# Patient Record
Sex: FEMALE | Race: WHITE | NOT HISPANIC OR LATINO | Employment: OTHER | URBAN - METROPOLITAN AREA
[De-identification: names, ages, dates, MRNs, and addresses within clinical notes are randomized per-mention and may not be internally consistent; named-entity substitution may affect disease eponyms.]

---

## 2018-05-14 ENCOUNTER — RECORDS - HEALTHEAST (OUTPATIENT)
Dept: LAB | Facility: CLINIC | Age: 58
End: 2018-05-14

## 2018-05-14 LAB
ALBUMIN SERPL-MCNC: 3.6 G/DL (ref 3.5–5)
ALP SERPL-CCNC: 121 U/L (ref 45–120)
ALT SERPL W P-5'-P-CCNC: 43 U/L (ref 0–45)
ANION GAP SERPL CALCULATED.3IONS-SCNC: 10 MMOL/L (ref 5–18)
AST SERPL W P-5'-P-CCNC: 25 U/L (ref 0–40)
BILIRUB SERPL-MCNC: 0.6 MG/DL (ref 0–1)
BUN SERPL-MCNC: 12 MG/DL (ref 8–22)
CALCIUM SERPL-MCNC: 9.1 MG/DL (ref 8.5–10.5)
CHLORIDE BLD-SCNC: 104 MMOL/L (ref 98–107)
CHOLEST SERPL-MCNC: 255 MG/DL
CO2 SERPL-SCNC: 26 MMOL/L (ref 22–31)
CREAT SERPL-MCNC: 0.82 MG/DL (ref 0.6–1.1)
FASTING STATUS PATIENT QL REPORTED: NO
GFR SERPL CREATININE-BSD FRML MDRD: >60 ML/MIN/1.73M2
GLUCOSE BLD-MCNC: 97 MG/DL (ref 70–125)
HDLC SERPL-MCNC: 45 MG/DL
LDLC SERPL CALC-MCNC: 170 MG/DL
POTASSIUM BLD-SCNC: 3.9 MMOL/L (ref 3.5–5)
PROT SERPL-MCNC: 6.9 G/DL (ref 6–8)
SODIUM SERPL-SCNC: 140 MMOL/L (ref 136–145)
T4 FREE SERPL-MCNC: 1.2 NG/DL (ref 0.7–1.8)
TRIGL SERPL-MCNC: 200 MG/DL
TSH SERPL DL<=0.005 MIU/L-ACNC: 1.78 UIU/ML (ref 0.3–5)

## 2018-06-29 ENCOUNTER — RECORDS - HEALTHEAST (OUTPATIENT)
Dept: LAB | Facility: CLINIC | Age: 58
End: 2018-06-29

## 2018-06-29 LAB
ALBUMIN SERPL-MCNC: 2.9 G/DL (ref 3.5–5)
ALP SERPL-CCNC: 100 U/L (ref 45–120)
ALT SERPL W P-5'-P-CCNC: 33 U/L (ref 0–45)
ANION GAP SERPL CALCULATED.3IONS-SCNC: 14 MMOL/L (ref 5–18)
AST SERPL W P-5'-P-CCNC: 24 U/L (ref 0–40)
BILIRUB SERPL-MCNC: 0.5 MG/DL (ref 0–1)
BUN SERPL-MCNC: 6 MG/DL (ref 8–22)
C REACTIVE PROTEIN LHE: 12.4 MG/DL (ref 0–0.8)
CALCIUM SERPL-MCNC: 8.9 MG/DL (ref 8.5–10.5)
CHLORIDE BLD-SCNC: 98 MMOL/L (ref 98–107)
CO2 SERPL-SCNC: 31 MMOL/L (ref 22–31)
CREAT SERPL-MCNC: 0.78 MG/DL (ref 0.6–1.1)
GFR SERPL CREATININE-BSD FRML MDRD: >60 ML/MIN/1.73M2
GLUCOSE BLD-MCNC: 100 MG/DL (ref 70–125)
LIPASE SERPL-CCNC: 9 U/L (ref 0–52)
POTASSIUM BLD-SCNC: 2.8 MMOL/L (ref 3.5–5)
PROT SERPL-MCNC: 6 G/DL (ref 6–8)
SODIUM SERPL-SCNC: 143 MMOL/L (ref 136–145)

## 2018-07-10 ENCOUNTER — RECORDS - HEALTHEAST (OUTPATIENT)
Dept: LAB | Facility: CLINIC | Age: 58
End: 2018-07-10

## 2018-07-10 LAB
MAGNESIUM SERPL-MCNC: 2 MG/DL (ref 1.8–2.6)
POTASSIUM BLD-SCNC: 4.5 MMOL/L (ref 3.5–5)

## 2018-11-06 ENCOUNTER — RECORDS - HEALTHEAST (OUTPATIENT)
Dept: LAB | Facility: CLINIC | Age: 58
End: 2018-11-06

## 2018-11-07 LAB
T4 FREE SERPL-MCNC: 1.3 NG/DL (ref 0.7–1.8)
TSH SERPL DL<=0.005 MIU/L-ACNC: 3.14 UIU/ML (ref 0.3–5)

## 2019-08-02 ENCOUNTER — RECORDS - HEALTHEAST (OUTPATIENT)
Dept: LAB | Facility: CLINIC | Age: 59
End: 2019-08-02

## 2019-08-02 LAB
ALBUMIN SERPL-MCNC: 4 G/DL (ref 3.5–5)
ALP SERPL-CCNC: 117 U/L (ref 45–120)
ALT SERPL W P-5'-P-CCNC: 24 U/L (ref 0–45)
ANION GAP SERPL CALCULATED.3IONS-SCNC: 11 MMOL/L (ref 5–18)
AST SERPL W P-5'-P-CCNC: 18 U/L (ref 0–40)
BILIRUB SERPL-MCNC: 0.5 MG/DL (ref 0–1)
BUN SERPL-MCNC: 12 MG/DL (ref 8–22)
C REACTIVE PROTEIN LHE: 0.5 MG/DL (ref 0–0.8)
CALCIUM SERPL-MCNC: 10.1 MG/DL (ref 8.5–10.5)
CHLORIDE BLD-SCNC: 101 MMOL/L (ref 98–107)
CO2 SERPL-SCNC: 28 MMOL/L (ref 22–31)
CREAT SERPL-MCNC: 0.85 MG/DL (ref 0.6–1.1)
ERYTHROCYTE [SEDIMENTATION RATE] IN BLOOD BY WESTERGREN METHOD: 28 MM/HR (ref 0–20)
GFR SERPL CREATININE-BSD FRML MDRD: >60 ML/MIN/1.73M2
GLUCOSE BLD-MCNC: 92 MG/DL (ref 70–125)
POTASSIUM BLD-SCNC: 4.5 MMOL/L (ref 3.5–5)
PROT SERPL-MCNC: 7.2 G/DL (ref 6–8)
SODIUM SERPL-SCNC: 140 MMOL/L (ref 136–145)
T4 FREE SERPL-MCNC: 1.2 NG/DL (ref 0.7–1.8)
TSH SERPL DL<=0.005 MIU/L-ACNC: 1.15 UIU/ML (ref 0.3–5)

## 2019-11-12 ENCOUNTER — TRANSFERRED RECORDS (OUTPATIENT)
Dept: HEALTH INFORMATION MANAGEMENT | Facility: CLINIC | Age: 59
End: 2019-11-12

## 2019-11-27 NOTE — TELEPHONE ENCOUNTER
ONCOLOGY INTAKE: Records Information      APPT INFORMATION: [id number] Dr. Tramaine Garland  Referring provider:  Dr. Luna Clark  Referring provider s clinic:  [facility]  Reason for visit/diagnosis:  Melanoma  Has patient been notified of appointment date and time?: Yes    RECORDS INFORMATION:  Were the records received with the referral (via [employer])? No    Has patient been seen for any external appt for this diagnosis? Yes    If yes, where? [facility]    Has patient had any imaging or procedures outside of [facility] for this condition? Yes      If Yes, where? [facility]    ADDITIONAL INFORMATION:  Patient said referral was faxed, but intake did not receive any information.

## 2019-11-30 NOTE — TELEPHONE ENCOUNTER
RECORDS STATUS - ALL OTHER DIAGNOSIS      RECORDS RECEIVED FROM: [facility]   DATE RECEIVED: REQUESTED    NOTES STATUS DETAILS   OFFICE NOTE from referring provider     OFFICE NOTE from medical oncologist     DISCHARGE SUMMARY from hospital     DISCHARGE REPORT from the ER     OPERATIVE REPORT     MEDICATION LIST     CLINICAL TRIAL TREATMENTS TO DATE     LABS     PATHOLOGY REPORTS Received 12/3/19 (PATRICK) [facility]   ANYTHING RELATED TO DIAGNOSIS Slides received 12/5/19 [facility]   GENONOMIC TESTING     TYPE:     IMAGING (NEED IMAGES & REPORT)     CT SCANS     MRI     MAMMO     ULTRASOUND     PET       Action    Action Taken REQUEST SENT TO [facility]

## 2019-12-10 PROBLEM — C43.59: Status: ACTIVE | Noted: 2019-12-10

## 2019-12-12 PROCEDURE — 00000346 ZZHCL STATISTIC REVIEW OUTSIDE SLIDES TC 88321: Performed by: SURGERY

## 2019-12-18 LAB — COPATH REPORT: NORMAL

## 2019-12-20 ENCOUNTER — PRE VISIT (OUTPATIENT)
Dept: ONCOLOGY | Facility: CLINIC | Age: 59
End: 2019-12-20

## 2019-12-20 ENCOUNTER — ONCOLOGY VISIT (OUTPATIENT)
Dept: ONCOLOGY | Facility: CLINIC | Age: 59
End: 2019-12-20
Attending: SURGERY
Payer: COMMERCIAL

## 2019-12-20 VITALS
BODY MASS INDEX: 37.62 KG/M2 | SYSTOLIC BLOOD PRESSURE: 159 MMHG | TEMPERATURE: 97.8 F | HEIGHT: 69 IN | HEART RATE: 75 BPM | WEIGHT: 254 LBS | OXYGEN SATURATION: 93 % | RESPIRATION RATE: 16 BRPM | DIASTOLIC BLOOD PRESSURE: 86 MMHG

## 2019-12-20 DIAGNOSIS — C43.59: ICD-10-CM

## 2019-12-20 PROCEDURE — G0463 HOSPITAL OUTPT CLINIC VISIT: HCPCS | Mod: ZF

## 2019-12-20 RX ORDER — BUDESONIDE AND FORMOTEROL FUMARATE DIHYDRATE 160; 4.5 UG/1; UG/1
2 AEROSOL RESPIRATORY (INHALATION) 2 TIMES DAILY
COMMUNITY

## 2019-12-20 RX ORDER — OMEPRAZOLE 40 MG/1
40 CAPSULE, DELAYED RELEASE ORAL DAILY
COMMUNITY

## 2019-12-20 RX ORDER — CEFAZOLIN SODIUM 1 G/50ML
1 INJECTION, SOLUTION INTRAVENOUS SEE ADMIN INSTRUCTIONS
Status: CANCELLED | OUTPATIENT
Start: 2019-12-20

## 2019-12-20 RX ORDER — MELOXICAM 15 MG/1
15 TABLET ORAL DAILY
COMMUNITY

## 2019-12-20 RX ORDER — CEFAZOLIN SODIUM 2 G/50ML
2 SOLUTION INTRAVENOUS
Status: CANCELLED | OUTPATIENT
Start: 2019-12-20

## 2019-12-20 RX ORDER — LEVOTHYROXINE SODIUM 100 UG/1
100 TABLET ORAL
COMMUNITY
End: 2020-01-13

## 2019-12-20 RX ORDER — HYDROCHLOROTHIAZIDE 25 MG/1
25 TABLET ORAL PRN
COMMUNITY
End: 2022-07-12

## 2019-12-20 RX ORDER — FLUOXETINE 10 MG/1
10 CAPSULE ORAL DAILY
COMMUNITY
End: 2020-01-13

## 2019-12-20 ASSESSMENT — PAIN SCALES - GENERAL: PAINLEVEL: NO PAIN (0)

## 2019-12-20 ASSESSMENT — MIFFLIN-ST. JEOR: SCORE: 1781.52

## 2019-12-20 NOTE — NURSING NOTE
"Oncology Rooming Note    December 20, 2019 9:37 AM   Dayana Valverde is a 59 year old female who presents for:    Chief Complaint   Patient presents with     Oncology Clinic Visit     P NEW- MELANOMA     Initial Vitals: BP (!) 159/86 (BP Location: Right arm, Patient Position: Chair, Cuff Size: Adult Large)   Pulse 75   Temp 97.8  F (36.6  C)   Resp 16   Ht 1.753 m (5' 9\")   Wt 115.2 kg (254 lb)   SpO2 93%   BMI 37.51 kg/m   Estimated body mass index is 37.51 kg/m  as calculated from the following:    Height as of this encounter: 1.753 m (5' 9\").    Weight as of this encounter: 115.2 kg (254 lb). Body surface area is 2.37 meters squared.  No Pain (0) Comment: Data Unavailable   No LMP recorded.  Allergies reviewed: Yes  Medications reviewed: Yes    Medications: Medication refills not needed today.  Pharmacy name entered into EPIC: [facility] - [place], [place] - [address]    Clinical concerns: No new concerns. Gill was notified.      John Chacon LPN            "

## 2019-12-20 NOTE — PROGRESS NOTES
HISTORY OF PRESENT ILLNESS:  Dayana Valverde is a 59-year-old woman I was asked to see at the request of Dr. Clark for evaluation of a new melanoma of her right scapula.  The patient stated she noted a pigmented skin lesion there for about a year.  It did not go away, so she sought medical attention.  She saw Dr. Clark who performed a biopsy which demonstrated a melanoma, 1.1 mm in thickness.  Ulceration was absent.  There were 3 mitotic figures per mm2 of tissue.  Deep margin was negative for melanoma.  She is now here to talk about treatment options.  She denies any symptoms of lymphadenopathy or metastatic disease.  She has had no prior history of skin cancer.         PAST MEDICAL HISTORY:  Significant for COPD.      FAMILY HISTORY:  Negative for melanoma.      PHYSICAL EXAMINATION:  She is a well-appearing woman in no apparent distress.  She has a healed biopsy site in her right scapula with no residual pigmentation.  Head and neck examination reveals no cervical, supraclavicular or infraclavicular lymphadenopathy axillary.  Axillary examination revealed no lymphadenopathy.      IMPRESSION:  Intermediate-thickness melanoma.      PLAN:  I recommend a wide local excision with a 1 cm margin of resection.  I recommend a sentinel lymph node biopsy.  I told her if the lymph node biopsy is positive then we would consider immune therapy.  We will schedule her surgery at her convenience.      cc:     Luna Clark MD   [facility]   [address] [place]  [address]

## 2019-12-23 ENCOUNTER — MYC MEDICAL ADVICE (OUTPATIENT)
Dept: PLASTIC SURGERY | Facility: CLINIC | Age: 59
End: 2019-12-23

## 2019-12-27 ENCOUNTER — TRANSFERRED RECORDS (OUTPATIENT)
Dept: HEALTH INFORMATION MANAGEMENT | Facility: CLINIC | Age: 59
End: 2019-12-27

## 2019-12-27 ENCOUNTER — RECORDS - HEALTHEAST (OUTPATIENT)
Dept: LAB | Facility: CLINIC | Age: 59
End: 2019-12-27

## 2019-12-27 LAB
ANION GAP SERPL CALCULATED.3IONS-SCNC: 11 MMOL/L (ref 5–18)
BUN SERPL-MCNC: 13 MG/DL (ref 8–22)
CALCIUM SERPL-MCNC: 9.1 MG/DL (ref 8.5–10.5)
CHLORIDE BLD-SCNC: 103 MMOL/L (ref 98–107)
CO2 SERPL-SCNC: 27 MMOL/L (ref 22–31)
CREAT SERPL-MCNC: 0.83 MG/DL (ref 0.6–1.1)
GFR SERPL CREATININE-BSD FRML MDRD: >60 ML/MIN/1.73M2
GLUCOSE BLD-MCNC: 106 MG/DL (ref 70–125)
POTASSIUM BLD-SCNC: 4.7 MMOL/L (ref 3.5–5)
SODIUM SERPL-SCNC: 141 MMOL/L (ref 136–145)

## 2019-12-30 ENCOUNTER — ANESTHESIA EVENT (OUTPATIENT)
Dept: SURGERY | Facility: AMBULATORY SURGERY CENTER | Age: 59
End: 2019-12-30

## 2019-12-30 LAB — 25(OH)D3 SERPL-MCNC: 48.4 NG/ML (ref 30–80)

## 2019-12-31 ENCOUNTER — ANESTHESIA (OUTPATIENT)
Dept: SURGERY | Facility: AMBULATORY SURGERY CENTER | Age: 59
End: 2019-12-31

## 2019-12-31 ENCOUNTER — HOSPITAL ENCOUNTER (OUTPATIENT)
Dept: NUCLEAR MEDICINE | Facility: CLINIC | Age: 59
Setting detail: NUCLEAR MEDICINE
Discharge: HOME | End: 2019-12-31
Attending: SURGERY | Admitting: SURGERY
Payer: COMMERCIAL

## 2019-12-31 ENCOUNTER — HOSPITAL ENCOUNTER (OUTPATIENT)
Facility: AMBULATORY SURGERY CENTER | Age: 59
End: 2019-12-31
Attending: SURGERY
Payer: COMMERCIAL

## 2019-12-31 VITALS
SYSTOLIC BLOOD PRESSURE: 125 MMHG | TEMPERATURE: 97.3 F | WEIGHT: 254 LBS | DIASTOLIC BLOOD PRESSURE: 44 MMHG | HEIGHT: 69 IN | BODY MASS INDEX: 37.62 KG/M2 | OXYGEN SATURATION: 92 % | RESPIRATION RATE: 16 BRPM | HEART RATE: 74 BPM

## 2019-12-31 DIAGNOSIS — C43.59: ICD-10-CM

## 2019-12-31 PROCEDURE — 34300033 ZZH RX 343: Performed by: SURGERY

## 2019-12-31 PROCEDURE — 78195 LYMPH SYSTEM IMAGING: CPT

## 2019-12-31 PROCEDURE — A9520 TC99 TILMANOCEPT DIAG 0.5MCI: HCPCS | Performed by: SURGERY

## 2019-12-31 RX ORDER — MEPERIDINE HYDROCHLORIDE 25 MG/ML
12.5 INJECTION INTRAMUSCULAR; INTRAVENOUS; SUBCUTANEOUS
Status: DISCONTINUED | OUTPATIENT
Start: 2019-12-31 | End: 2020-01-01 | Stop reason: HOSPADM

## 2019-12-31 RX ORDER — OXYCODONE HYDROCHLORIDE 5 MG/1
5 TABLET ORAL EVERY 4 HOURS PRN
Status: DISCONTINUED | OUTPATIENT
Start: 2019-12-31 | End: 2020-01-01 | Stop reason: HOSPADM

## 2019-12-31 RX ORDER — ACETAMINOPHEN 325 MG/1
975 TABLET ORAL ONCE
Status: COMPLETED | OUTPATIENT
Start: 2019-12-31 | End: 2019-12-31

## 2019-12-31 RX ORDER — ACETAMINOPHEN 325 MG/1
650 TABLET ORAL EVERY 4 HOURS PRN
Qty: 50 TABLET | Refills: 0 | Status: SHIPPED | OUTPATIENT
Start: 2019-12-31 | End: 2022-11-04

## 2019-12-31 RX ORDER — FENTANYL CITRATE 50 UG/ML
INJECTION, SOLUTION INTRAMUSCULAR; INTRAVENOUS PRN
Status: DISCONTINUED | OUTPATIENT
Start: 2019-12-31 | End: 2019-12-31

## 2019-12-31 RX ORDER — PROPOFOL 10 MG/ML
INJECTION, EMULSION INTRAVENOUS CONTINUOUS PRN
Status: DISCONTINUED | OUTPATIENT
Start: 2019-12-31 | End: 2019-12-31

## 2019-12-31 RX ORDER — SODIUM CHLORIDE, SODIUM LACTATE, POTASSIUM CHLORIDE, CALCIUM CHLORIDE 600; 310; 30; 20 MG/100ML; MG/100ML; MG/100ML; MG/100ML
INJECTION, SOLUTION INTRAVENOUS CONTINUOUS
Status: DISCONTINUED | OUTPATIENT
Start: 2019-12-31 | End: 2019-12-31 | Stop reason: HOSPADM

## 2019-12-31 RX ORDER — FENTANYL CITRATE 50 UG/ML
25-50 INJECTION, SOLUTION INTRAMUSCULAR; INTRAVENOUS
Status: DISCONTINUED | OUTPATIENT
Start: 2019-12-31 | End: 2019-12-31 | Stop reason: HOSPADM

## 2019-12-31 RX ORDER — NALOXONE HYDROCHLORIDE 0.4 MG/ML
.1-.4 INJECTION, SOLUTION INTRAMUSCULAR; INTRAVENOUS; SUBCUTANEOUS
Status: DISCONTINUED | OUTPATIENT
Start: 2019-12-31 | End: 2020-01-01 | Stop reason: HOSPADM

## 2019-12-31 RX ORDER — OXYCODONE HYDROCHLORIDE 5 MG/1
5-10 TABLET ORAL EVERY 4 HOURS PRN
Qty: 16 TABLET | Refills: 0 | Status: SHIPPED | OUTPATIENT
Start: 2019-12-31 | End: 2022-11-04

## 2019-12-31 RX ORDER — CEFAZOLIN SODIUM 1 G/50ML
1 SOLUTION INTRAVENOUS SEE ADMIN INSTRUCTIONS
Status: DISCONTINUED | OUTPATIENT
Start: 2019-12-31 | End: 2019-12-31 | Stop reason: HOSPADM

## 2019-12-31 RX ORDER — LIDOCAINE 40 MG/G
CREAM TOPICAL
Status: DISCONTINUED | OUTPATIENT
Start: 2019-12-31 | End: 2019-12-31 | Stop reason: HOSPADM

## 2019-12-31 RX ORDER — AMOXICILLIN 250 MG
1-2 CAPSULE ORAL 2 TIMES DAILY
Qty: 15 TABLET | Refills: 0 | Status: SHIPPED | OUTPATIENT
Start: 2019-12-31 | End: 2022-11-04

## 2019-12-31 RX ORDER — PROPOFOL 10 MG/ML
INJECTION, EMULSION INTRAVENOUS PRN
Status: DISCONTINUED | OUTPATIENT
Start: 2019-12-31 | End: 2019-12-31

## 2019-12-31 RX ORDER — SODIUM CHLORIDE, SODIUM LACTATE, POTASSIUM CHLORIDE, CALCIUM CHLORIDE 600; 310; 30; 20 MG/100ML; MG/100ML; MG/100ML; MG/100ML
INJECTION, SOLUTION INTRAVENOUS CONTINUOUS
Status: DISCONTINUED | OUTPATIENT
Start: 2019-12-31 | End: 2020-01-01 | Stop reason: HOSPADM

## 2019-12-31 RX ORDER — ONDANSETRON 2 MG/ML
4 INJECTION INTRAMUSCULAR; INTRAVENOUS EVERY 30 MIN PRN
Status: DISCONTINUED | OUTPATIENT
Start: 2019-12-31 | End: 2020-01-01 | Stop reason: HOSPADM

## 2019-12-31 RX ORDER — CEFAZOLIN SODIUM 2 G/50ML
2 SOLUTION INTRAVENOUS
Status: COMPLETED | OUTPATIENT
Start: 2019-12-31 | End: 2019-12-31

## 2019-12-31 RX ORDER — OXYCODONE HYDROCHLORIDE 5 MG/1
5 TABLET ORAL
Status: DISCONTINUED | OUTPATIENT
Start: 2019-12-31 | End: 2020-01-01 | Stop reason: HOSPADM

## 2019-12-31 RX ORDER — ONDANSETRON 2 MG/ML
INJECTION INTRAMUSCULAR; INTRAVENOUS PRN
Status: DISCONTINUED | OUTPATIENT
Start: 2019-12-31 | End: 2019-12-31

## 2019-12-31 RX ORDER — DEXAMETHASONE SODIUM PHOSPHATE 4 MG/ML
INJECTION, SOLUTION INTRA-ARTICULAR; INTRALESIONAL; INTRAMUSCULAR; INTRAVENOUS; SOFT TISSUE PRN
Status: DISCONTINUED | OUTPATIENT
Start: 2019-12-31 | End: 2019-12-31

## 2019-12-31 RX ORDER — LIDOCAINE HYDROCHLORIDE 20 MG/ML
INJECTION, SOLUTION INFILTRATION; PERINEURAL PRN
Status: DISCONTINUED | OUTPATIENT
Start: 2019-12-31 | End: 2019-12-31

## 2019-12-31 RX ORDER — GABAPENTIN 300 MG/1
300 CAPSULE ORAL ONCE
Status: COMPLETED | OUTPATIENT
Start: 2019-12-31 | End: 2019-12-31

## 2019-12-31 RX ORDER — ACETAMINOPHEN 325 MG/1
650 TABLET ORAL
Status: DISCONTINUED | OUTPATIENT
Start: 2019-12-31 | End: 2020-01-01 | Stop reason: HOSPADM

## 2019-12-31 RX ORDER — ONDANSETRON 4 MG/1
4 TABLET, ORALLY DISINTEGRATING ORAL EVERY 30 MIN PRN
Status: DISCONTINUED | OUTPATIENT
Start: 2019-12-31 | End: 2020-01-01 | Stop reason: HOSPADM

## 2019-12-31 RX ADMIN — LIDOCAINE HYDROCHLORIDE 100 MG: 20 INJECTION, SOLUTION INFILTRATION; PERINEURAL at 12:02

## 2019-12-31 RX ADMIN — FENTANYL CITRATE 50 MCG: 50 INJECTION, SOLUTION INTRAMUSCULAR; INTRAVENOUS at 12:02

## 2019-12-31 RX ADMIN — CEFAZOLIN SODIUM 2 G: 2 SOLUTION INTRAVENOUS at 12:07

## 2019-12-31 RX ADMIN — SODIUM CHLORIDE, SODIUM LACTATE, POTASSIUM CHLORIDE, CALCIUM CHLORIDE: 600; 310; 30; 20 INJECTION, SOLUTION INTRAVENOUS at 11:55

## 2019-12-31 RX ADMIN — FENTANYL CITRATE 50 MCG: 50 INJECTION, SOLUTION INTRAMUSCULAR; INTRAVENOUS at 12:41

## 2019-12-31 RX ADMIN — TILMANOCEPT 0.5 MCI.: KIT at 07:44

## 2019-12-31 RX ADMIN — PROPOFOL 200 MG: 10 INJECTION, EMULSION INTRAVENOUS at 12:02

## 2019-12-31 RX ADMIN — DEXAMETHASONE SODIUM PHOSPHATE 4 MG: 4 INJECTION, SOLUTION INTRA-ARTICULAR; INTRALESIONAL; INTRAMUSCULAR; INTRAVENOUS; SOFT TISSUE at 12:22

## 2019-12-31 RX ADMIN — ONDANSETRON 4 MG: 2 INJECTION INTRAMUSCULAR; INTRAVENOUS at 12:39

## 2019-12-31 RX ADMIN — PROPOFOL 150 MCG/KG/MIN: 10 INJECTION, EMULSION INTRAVENOUS at 12:06

## 2019-12-31 RX ADMIN — GABAPENTIN 300 MG: 300 CAPSULE ORAL at 09:56

## 2019-12-31 RX ADMIN — ACETAMINOPHEN 975 MG: 325 TABLET ORAL at 09:56

## 2019-12-31 ASSESSMENT — MIFFLIN-ST. JEOR: SCORE: 1781.52

## 2019-12-31 ASSESSMENT — COPD QUESTIONNAIRES: COPD: 1

## 2019-12-31 NOTE — ANESTHESIA PREPROCEDURE EVALUATION
Anesthesia Pre-Procedure Evaluation    Patient: Dayana Valverde   MRN:     [id number] Gender:   female   Age:    59 year old :      1960        Preoperative Diagnosis: Melanoma of scapular region (H) [C43.59]   Procedure(s):  Wide local excision of right back melanoma  [place] lymph node biopsy     Past Medical History:   Diagnosis Date     Complication of anesthesia      PONV (postoperative nausea and vomiting)       History reviewed. No pertinent surgical history.       Anesthesia Evaluation     . Pt has had prior anesthetic.     History of anesthetic complications   - PONV        ROS/MED HX    ENT/Pulmonary:     (+)IVETTE risk factors obese, COPD, , . .    Neurologic:  - neg neurologic ROS     Cardiovascular:         METS/Exercise Tolerance:  >4 METS   Hematologic:         Musculoskeletal:         GI/Hepatic:     (+) GERD Asymptomatic on medication,       Renal/Genitourinary:  - ROS Renal section negative       Endo:     (+) Obesity, .      Psychiatric:  - neg psychiatric ROS       Infectious Disease:  - neg infectious disease ROS       Malignancy:         Other:                         PHYSICAL EXAM:   Mental Status/Neuro: A/A/O   Airway: Facies: Feasible  Mallampati: II  Mouth/Opening: Full  TM distance: > 6 cm  Neck ROM: Full   Respiratory: Auscultation: CTAB     Resp. Rate: Normal     Resp. Effort: Normal      CV: Rhythm: Regular  Rate: Age appropriate  Heart: Normal Sounds  Edema: None   Comments:      Dental: Normal Dentition                LABS:  CBC: No results found for: WBC, HGB, HCT, PLT  BMP: No results found for: NA, POTASSIUM, CHLORIDE, CO2, BUN, CR, GLC  COAGS: No results found for: PTT, INR, FIBR  POC: No results found for: BGM, HCG, HCGS  OTHER: No results found for: PH, LACT, A1C, SHANNON, PHOS, MAG, ALBUMIN, PROTTOTAL, ALT, AST, GGT, ALKPHOS, BILITOTAL, BILIDIRECT, LIPASE, AMYLASE, NORMA, TSH, T4, T3, CRP, SED     Preop Vitals    BP Readings from Last 3 Encounters:   19 (!) 144/85  "  12/20/19 (!) 159/86    Pulse Readings from Last 3 Encounters:   12/31/19 75   12/20/19 75      Resp Readings from Last 3 Encounters:   12/31/19 16   12/20/19 16    SpO2 Readings from Last 3 Encounters:   12/31/19 97%   12/20/19 93%      Temp Readings from Last 1 Encounters:   12/31/19 36.6  C (97.8  F) (Oral)    Ht Readings from Last 1 Encounters:   12/31/19 1.753 m (5' 9\")      Wt Readings from Last 1 Encounters:   12/31/19 115.2 kg (254 lb)    Estimated body mass index is 37.51 kg/m  as calculated from the following:    Height as of this encounter: 1.753 m (5' 9\").    Weight as of this encounter: 115.2 kg (254 lb).     LDA:        Assessment:   ASA SCORE: 3      Smoking Status:  Non-Smoker/Unknown   NPO Status: NPO Appropriate     Plan:   Anes. Type:  General   Pre-Medication: None   Induction:  IV (Standard)   Airway: ETT; Oral   Access/Monitoring: PIV   Maintenance: TIVA     Postop Plan:   Postop Pain: Opioids  Postop Sedation/Airway: Not planned  Disposition: Outpatient     PONV Management:   Adult Risk Factors: Female, H/o PONV or Motion Sickness, Non-Smoker, Postop Opioids   Prevention: Ondansetron, Dexamethasone, No Volatiles     CONSENT: Direct conversation   Plan and risks discussed with: Patient   Blood Products: Consent Deferred (Minimal Blood Loss)                   Adolfo Kraft MD  "

## 2019-12-31 NOTE — OP NOTE
Procedure Date: 12/31/2019      PREOPERATIVE DIAGNOSIS:  Melanoma, right back.      POSTOPERATIVE DIAGNOSIS:  Melanoma, right back.      PROCEDURE:  Lymphatic mapping, right axillary sentinel lymph node biopsy x1, wide local excision of right back melanoma.      ATTENDING SURGEON:  Tramaine Garland MD      ANESTHESIA:  General with LMA.      INDICATIONS FOR SURGERY:  The patient is a 59-year-old woman who presents with an intermediate thickness melanoma of her right back near the scapula.  She had preoperative lymphoscintigraphy, which revealed uptake to the right axilla.  She now presents for surgical treatment.      PROCEDURE IN DETAIL:  After informed consent, the patient was brought to the operating room, given a general anesthetic and an LMA was placed.  She was situated in the lateral decubitus position with the right arm up.  I injected isosulfan blue intradermally around her biopsy scar.  She was then prepped and draped in the usual fashion.  I drew out a 1 cm margin of resection around the biopsy scar and created an elliptical skin incision.  Before doing the skin incision, I injected local anesthetic.  Elliptical skin incision was made with a scalpel.  The Bovie cautery was used to incise the subcutaneous tissues.  The dissection went through the subcutaneous fat down to the fascia.  Specimen was removed, oriented and sent to pathology.  Strict hemostasis was obtained with the Bovie cautery.  The dermis was closed in layers with interrupted 3-0 Vicryl suture for the dermal layer and a running 4-0 PDS subcuticular stitch for the skin.  I also placed horizontal mattress sutures into the dermis to serve as retention sutures.  Dermabond was placed.  We then identified a transcutaneous hot spot in the right axilla.  Local anesthetic was administered.  A transverse axillary incision was made with a scalpel.  The Bovie cautery was used to incise the subcutaneous tissues.  Dissection proceeded through the  clavipectoral fascia.  I identified a blue-stained radioactive lymph node.  Check lymph node #1 was removed and had ex vivo counts of 670 counts per second.  There were no additional blue, radioactive or palpable lymph nodes.  This incision was also closed with interrupted 3-0 Vicryl sutures for the dermal layer and a running 4-0 PDS subcuticular stitch for the skin.  Dermabond was placed and the patient was taken to the recovery room in stable condition.         JOHN LEMA MD             D: 2019   T: 2019   MT: saran      Name:     EZEKIEL ALMAGUER   MRN:      [id number]        Account:        [id number]   :      1960           Procedure Date: 2019      Document: [id number]

## 2019-12-31 NOTE — ANESTHESIA CARE TRANSFER NOTE
Patient: Dayana Valverde    Procedure(s):  Wide local excision of right back melanoma  [place] lymph node biopsy    Diagnosis: Melanoma of scapular region (H) [C43.59]  Diagnosis Additional Information: No value filed.    Anesthesia Type:   General     Note:  Airway :Room Air  Patient transferred to:PACU  Handoff Report: Identifed the Patient, Identified the Reponsible Provider, Reviewed the pertinent medical history, Discussed the surgical course, Reviewed Intra-OP anesthesia mangement and issues during anesthesia, Set expectations for post-procedure period and Allowed opportunity for questions and acknowledgement of understanding      Vitals: (Last set prior to Anesthesia Care Transfer)    CRNA VITALS  12/31/2019 1243 - 12/31/2019 1321      12/31/2019             Resp Rate (set):  10                Electronically Signed By: AMBER Young CRNA  December 31, 2019  1:21 PM

## 2019-12-31 NOTE — ANESTHESIA POSTPROCEDURE EVALUATION
Anesthesia POST Procedure Evaluation    Patient: Dayana Valverde   MRN:     [id number] Gender:   female   Age:    59 year old :      1960        Preoperative Diagnosis: Melanoma of scapular region (H) [C43.59]   Procedure(s):  Wide local excision of right back melanoma  [place] lymph node biopsy   Postop Comments: No value filed.       Anesthesia Type:  Not documented  General    Reportable Event: NO     PAIN: Uncomplicated   Sign Out status: Comfortable, Well controlled pain     PONV: No PONV   Sign Out status:  No Nausea or Vomiting     Neuro/Psych: Uneventful perioperative course   Sign Out Status: Preoperative baseline; Age appropriate mentation     Airway/Resp.: Uneventful perioperative course   Sign Out Status: Non labored breathing, age appropriate RR; Resp. Status within EXPECTED Parameters     CV: Uneventful perioperative course   Sign Out status: Appropriate BP and perfusion indices; Appropriate HR/Rhythm     Disposition:   Sign Out in:  Phase II  Disposition:  Home  Recovery Course: Uneventful  Follow-Up: Not required           Last Anesthesia Record Vitals:  CRNA VITALS  2019 1243 - 2019 1343      2019             EKG:  NSR          Last PACU Vitals:  Vitals Value Taken Time   BP     Temp 36.2  C (97.2  F) 2019  1:25 PM   Pulse 69 2019  1:24 PM   Resp 13 2019  1:26 PM   SpO2 92 % 2019  1:26 PM   Temp src Nasopharynx 2019  1:25 PM   NIBP     Pulse     SpO2     Resp     Temp     Ht Rate     Temp 2     Vitals shown include unvalidated device data.      Electronically Signed By: Juan Neal MD, 2019, 2:07 PM

## 2019-12-31 NOTE — DISCHARGE INSTRUCTIONS
[facility]  Home Care Following Anesthesia  For 24 hours after surgery:  1. Get plenty of rest.  A responsible adult must stay with you for at least 24 hours after you leave the surgery center.  2. Do not drive or use heavy equipment.  If you have weakness or tingling, don't drive or use heavy equipment until this feeling goes away.   3. Do not drink alcohol.   4. Avoid strenuous or risky activities.  Ask for help when climbing stairs.  5. You may feel lightheaded.  IF so, sit for a few minutes before standing.  Have someone help you get up.   6. If you have nausea (feel sick to your stomach): Drink only clear liquids such as apple juice, ginger ale, broth or 7-Up.  Rest may also help.  Be sure to drink enough fluids.  Move to a regular diet as you feel able.   7. You may have a slight fever.  Call the doctor if your fever is over 100 F (37.7 C) (taken under the tongue) or lasts longer than 24 hours.  8. You may have a dry mouth, a sore throat, muscle aches or trouble sleeping. These should go away after 24 hours.  9. Do not make important or legal decisions.        Tips for taking pain medications  To get the best pain relief possible, remember these points:    Take pain medications as directed, before pain becomes severe.    Pain medication can upset your stomach: taking it with food may help.    Constipation is a common side effect of pain medication. Drink plenty of  fluids.    Eat foods high in fiber. Take a stool softener if recommended by your doctor or pharmacist.    Do not drink alcohol, drive or operate machinery while taking pain medications.    Ask about other ways to control pain, such as with heat, ice or relaxation.    Tylenol/Acetaminophen Consumption  To help encourage the safe use of acetaminophen, the makers of TYLENOL  have lowered the maximum daily dose for single-ingredient Extra Strength TYLENOL  (acetaminophen) products sold in the [place] from 8 pills per  day (4,000 mg) to 6 pills per day (3,000 mg). The dosing interval has also changed from 2 pills every 4-6 hours to 2 pills every 6 hours.    If you feel your pain relief is insufficient, you may take Tylenol/Acetaminophen in addition to your narcotic pain medication.     Be careful not to exceed 3,000 mg of Tylenol/Acetaminophen in a 24 hour period from all sources.    If you are taking extra strength Tylenol/acetaminophen (500 mg), the maximum dose is 6 tablets in 24 hours.    If you are taking regular strength acetaminophen (325 mg), the maximum dose is 9 tablets in 24 hours.    **975 mg Tylenol taken at 10, can take again at 4 PM    Call a doctor for any of the followin. Signs of infection (fever, growing tenderness at the surgery site, a large amount of drainage or bleeding, severe pain, foul-smelling drainage, redness, swelling).  2. It has been over 8 to 10 hours since surgery and you are still not able to urinate (pass water).  3. Headache for over 24 hours.  Your doctor is:       Dr. Tramaine Garland, [facility]: [phone]               Or dial [phone] and ask for the resident on call for:  [facility]  For emergency care, call the:  [place] [facility]:  [phone] (TTY for hearing impaired: [phone])

## 2020-01-01 ENCOUNTER — TELEPHONE (OUTPATIENT)
Dept: SURGERY | Facility: CLINIC | Age: 60
End: 2020-01-01

## 2020-01-01 ENCOUNTER — NURSE TRIAGE (OUTPATIENT)
Dept: NURSING | Facility: CLINIC | Age: 60
End: 2020-01-01

## 2020-01-01 NOTE — TELEPHONE ENCOUNTER
Patient calling for wound care instructions. Home care instructions do not tell her how to care for her wound. She wants to shower, it has been over 24 hours. Reviewed guideline home care on wound care.    Additional Information    Negative: [1] Major abdominal surgical incision AND [2] wound gaping open AND [3] visible internal organs    Negative: Sounds like a life-threatening emergency to the triager    Negative: [1] Bleeding from incision AND [2] won't stop after 10 minutes of direct pressure    Negative: [1] Widespread rash AND [2] bright red, sunburn-like    Negative: Severe pain in the incision    Negative: [1] Suture came out early AND [2] wound gaping AND [3] < 48 hours since sutures placed    Negative: [1] Incision gaping open AND [2] length of opening > 2 inches (5 cm)    Negative: Patient sounds very sick or weak to the triager    Negative: Sounds like a serious complication to the triager    Negative: Fever > 100.5 F (38.1 C)    Negative: [1] Incision looks infected (spreading redness, pain) AND [2] fever > 99.5 F (37.5 C)    Negative: [1] Incision looks infected (spreading redness, pain) AND [2] large red area (> 2 in. or 5 cm)    Negative: [1] Incision looks infected (spreading redness, pain) AND [2] face wound    Negative: [1] Red streak runs from the incision AND [2] longer than 1 inch (2.5 cm)    Negative: [1] Pus or bad-smelling fluid draining from incision AND [2] no fever    Negative: [1] Post-op pain AND [2] not controlled with pain medications    Negative: Dressing soaked with blood or body fluid (e.g., drainage)    Negative: [1] Caller has URGENT question AND [2] triager unable to answer question    Negative: [1] Small red area or streak AND [2] no fever    Negative: [1] Clear or blood-tinged fluid draining from wound AND [2] no fever    Negative: [1] 48 hours since surgery AND [2] wound is becoming more tender    Negative: [1] Incision gaping open AND [2] on the face AND [3] > 48 hours since  sutures placed    Negative: [1] Incision gaping open AND [3] length of opening > 1/2 inch (1 cm) AND [3] > 48 hours since sutures placed    Negative: Suture or staple removal is overdue    Negative: [1] Suture or staple came out early AND [2] caller wants wound checked    Negative: [1] Caller has NON-URGENT question AND [2] triager unable to answer question    Negative: Pimple where a stitch comes through the skin    Sutured or stapled surgical incision, questions about    Negative: Skin tape (e.g., Steri-strips) removal, questions about    Negative: Suture removal date, questions about    Negative: Suture or staple came out early    Protocols used: POST-OP INCISION SYMPTOMS-A-AH

## 2020-01-03 ENCOUNTER — TELEPHONE (OUTPATIENT)
Dept: ONCOLOGY | Facility: CLINIC | Age: 60
End: 2020-01-03

## 2020-01-03 NOTE — TELEPHONE ENCOUNTER
POST-OP CALL  Andres 3, 2020    Dayana Valverde is a 59 year old female s/p   Lymphatic mapping, right axillary sentinel lymph node biopsy x1, wide local excision of right back melanoma.     She reports doing well but tired.   Fevers/chills: Patient denies fever/chills.  Eating/drinking: Patient is able to eat and drink without any complaints.   Bowel habits: Patient reports having a normal bowel movement.  Urine output: Voiding without difficulty.   Incisions: Patient denies any signs and symptoms of infection. No erythema, swelling or drainage.   Pain: Patient reports pain is controlled with tylenol. She has some burning in her axilla, discussed this was to be expected.   Follow up appointment scheduled on 1/13 with Dr. Garland.  Patient will call with any questions or concerns.    Jessica Moore PA-C

## 2020-01-06 LAB — COPATH REPORT: NORMAL

## 2020-01-07 ASSESSMENT — ENCOUNTER SYMPTOMS
SHORTNESS OF BREATH: 1
MYALGIAS: 1
POSTURAL DYSPNEA: 0
HEMOPTYSIS: 0
JOINT SWELLING: 1
DYSPNEA ON EXERTION: 1
NECK PAIN: 1
MUSCLE WEAKNESS: 0
SPUTUM PRODUCTION: 0
COUGH DISTURBING SLEEP: 0
STIFFNESS: 1
SNORES LOUDLY: 0
COUGH: 0
MUSCLE CRAMPS: 1
BACK PAIN: 1
ARTHRALGIAS: 1
WHEEZING: 0

## 2020-01-13 ENCOUNTER — OFFICE VISIT (OUTPATIENT)
Dept: ONCOLOGY | Facility: CLINIC | Age: 60
End: 2020-01-13
Attending: SURGERY
Payer: COMMERCIAL

## 2020-01-13 VITALS
WEIGHT: 256.3 LBS | OXYGEN SATURATION: 94 % | BODY MASS INDEX: 37.96 KG/M2 | DIASTOLIC BLOOD PRESSURE: 82 MMHG | RESPIRATION RATE: 18 BRPM | HEART RATE: 77 BPM | SYSTOLIC BLOOD PRESSURE: 145 MMHG | HEIGHT: 69 IN | TEMPERATURE: 97.8 F

## 2020-01-13 DIAGNOSIS — C43.59: Primary | ICD-10-CM

## 2020-01-13 PROCEDURE — G0463 HOSPITAL OUTPT CLINIC VISIT: HCPCS | Mod: ZF

## 2020-01-13 RX ORDER — LEVOTHYROXINE SODIUM 112 UG/1
112 TABLET ORAL DAILY
COMMUNITY
End: 2024-07-31

## 2020-01-13 ASSESSMENT — MIFFLIN-ST. JEOR: SCORE: 1791.95

## 2020-01-13 NOTE — NURSING NOTE
"Oncology Rooming Note    January 13, 2020 2:29 PM   Dayana Valverde is a 59 year old female who presents for:    Chief Complaint   Patient presents with     Oncology Clinic Visit     RETURN VISIT; POST OP; MELANOMA OF SCAPULAR REGION; VITALS TAKEN      Initial Vitals: BP (!) 145/82   Pulse 77   Temp 97.8  F (36.6  C) (Oral)   Resp 18   Ht 1.753 m (5' 9\")   Wt 116.3 kg (256 lb 4.8 oz)   LMP 11/30/1997   SpO2 94%   Breastfeeding No   BMI 37.85 kg/m   Estimated body mass index is 37.85 kg/m  as calculated from the following:    Height as of this encounter: 1.753 m (5' 9\").    Weight as of this encounter: 116.3 kg (256 lb 4.8 oz). Body surface area is 2.38 meters squared.  Data Unavailable Comment: Data Unavailable   Patient's last menstrual period was 11/30/1997.  Allergies reviewed: Yes  Medications reviewed: Yes    Medications: Medication refills not needed today.  Pharmacy name entered into EPIC: [facility] - [place], [place] - [address]    Clinical concerns: No new concerns today  Dr Garland  was notified.      Minnie Espinosa              "

## 2020-01-13 NOTE — PROGRESS NOTES
HISTORY OF PRESENT ILLNESS:  Dayana Valverde is here for a postoperative visit after undergoing a wide local excision of a melanoma of her back with a sentinel lymph node biopsy.  Her final pathology report revealed some residual melanoma in situ.  No residual invasive melanoma and her pathology and her sentinel lymph node were negative.      PHYSICAL EXAMINATION:     SKIN:  Both of her incisions are healing well with no evidence of infection.      IMPRESSION:  Postop check.      PLAN:  I have informed her that she does not need any scans or immune therapy or additional surgery.  I did recommend that she follow up with Dr. Clark several times a year for surveillance.  I will see her in the future if any problems arise.      cc:     Luna Clark MD    [facility], [place] [phone]

## 2020-01-13 NOTE — LETTER
1/13/2020       RE: Dayana Valverde  [address] [place] [address]     Dear Colleague,    Thank you for referring your patient, Dayana Valverde, to the [facility] at [facility]. Please see a copy of my visit note below.    HISTORY OF PRESENT ILLNESS:  Dayana Valverde is here for a postoperative visit after undergoing a wide local excision of a melanoma of her back with a sentinel lymph node biopsy.  Her final pathology report revealed some residual melanoma in situ.  No residual invasive melanoma and her pathology and her sentinel lymph node were negative.      PHYSICAL EXAMINATION:     SKIN:  Both of her incisions are healing well with no evidence of infection.      IMPRESSION:  Postop check.      PLAN:  I have informed her that she does not need any scans or immune therapy or additional surgery.  I did recommend that she follow up with Dr. Clark several times a year for surveillance.  I will see her in the future if any problems arise.     Again, thank you for allowing me to participate in the care of your patient.      Sincerely,    Tramaine Garland MD     cc:     Luna Clark MD    [facility], [place] [phone]

## 2020-03-11 ENCOUNTER — HEALTH MAINTENANCE LETTER (OUTPATIENT)
Age: 60
End: 2020-03-11

## 2020-07-31 ENCOUNTER — COMMUNICATION - HEALTHEAST (OUTPATIENT)
Dept: PHARMACY | Facility: HOSPITAL | Age: 60
End: 2020-07-31

## 2020-11-25 ENCOUNTER — RECORDS - HEALTHEAST (OUTPATIENT)
Dept: LAB | Facility: CLINIC | Age: 60
End: 2020-11-25

## 2020-11-25 ENCOUNTER — RECORDS - HEALTHEAST (OUTPATIENT)
Dept: ADMINISTRATIVE | Facility: OTHER | Age: 60
End: 2020-11-25

## 2020-11-25 LAB
T4 FREE SERPL-MCNC: 1.4 NG/DL (ref 0.7–1.8)
TSH SERPL DL<=0.005 MIU/L-ACNC: 1.17 UIU/ML (ref 0.3–5)

## 2020-12-10 ENCOUNTER — HOSPITAL ENCOUNTER (OUTPATIENT)
Dept: NUCLEAR MEDICINE | Facility: CLINIC | Age: 60
Discharge: HOME | End: 2020-12-10
Attending: FAMILY MEDICINE

## 2020-12-10 ENCOUNTER — HOSPITAL ENCOUNTER (OUTPATIENT)
Dept: CARDIOLOGY | Facility: CLINIC | Age: 60
Discharge: HOME | End: 2020-12-10
Attending: FAMILY MEDICINE

## 2020-12-10 DIAGNOSIS — J44.9 CHRONIC OBSTRUCTIVE PULMONARY DISEASE, UNSPECIFIED COPD TYPE (H): ICD-10-CM

## 2020-12-10 LAB
CV STRESS CURRENT BP HE: NORMAL
CV STRESS CURRENT HR HE: 102
CV STRESS CURRENT HR HE: 104
CV STRESS CURRENT HR HE: 106
CV STRESS CURRENT HR HE: 107
CV STRESS CURRENT HR HE: 108
CV STRESS CURRENT HR HE: 108
CV STRESS CURRENT HR HE: 111
CV STRESS CURRENT HR HE: 111
CV STRESS CURRENT HR HE: 113
CV STRESS CURRENT HR HE: 87
CV STRESS CURRENT HR HE: 87
CV STRESS CURRENT HR HE: 94
CV STRESS CURRENT HR HE: 95
CV STRESS CURRENT HR HE: 95
CV STRESS CURRENT HR HE: 97
CV STRESS CURRENT HR HE: 97
CV STRESS CURRENT HR HE: 98
CV STRESS DEVIATION TIME HE: NORMAL
CV STRESS ECHO PERCENT HR HE: NORMAL
CV STRESS EXERCISE STAGE HE: NORMAL
CV STRESS FINAL RESTING BP HE: NORMAL
CV STRESS FINAL RESTING HR HE: 94
CV STRESS MAX HR HE: 116
CV STRESS MAX TREADMILL GRADE HE: 0
CV STRESS MAX TREADMILL SPEED HE: 0
CV STRESS PEAK DIA BP HE: NORMAL
CV STRESS PEAK SYS BP HE: NORMAL
CV STRESS PHASE HE: NORMAL
CV STRESS PROTOCOL HE: NORMAL
CV STRESS RESTING PT POSITION HE: NORMAL
CV STRESS ST DEVIATION AMOUNT HE: NORMAL
CV STRESS ST DEVIATION ELEVATION HE: NORMAL
CV STRESS ST EVELATION AMOUNT HE: NORMAL
CV STRESS TEST TYPE HE: NORMAL
CV STRESS TOTAL STAGE TIME MIN 1 HE: NORMAL
RATE PRESSURE PRODUCT: NORMAL
STRESS ECHO BASELINE DIASTOLIC HE: 77
STRESS ECHO BASELINE HR: 79
STRESS ECHO BASELINE SYSTOLIC BP: 150
STRESS ECHO CALCULATED PERCENT HR: 73 %
STRESS ECHO LAST STRESS DIASTOLIC BP: 93
STRESS ECHO LAST STRESS HR: 106
STRESS ECHO LAST STRESS SYSTOLIC BP: 198
STRESS ECHO TARGET HR: 158
STRESS/REST PERFUSION RATIO: 1.9

## 2020-12-11 ENCOUNTER — RECORDS - HEALTHEAST (OUTPATIENT)
Dept: ADMINISTRATIVE | Facility: OTHER | Age: 60
End: 2020-12-11

## 2020-12-11 ENCOUNTER — COMMUNICATION - HEALTHEAST (OUTPATIENT)
Dept: CARDIOLOGY | Facility: CLINIC | Age: 60
End: 2020-12-11

## 2020-12-14 ENCOUNTER — AMBULATORY - HEALTHEAST (OUTPATIENT)
Dept: CARDIOLOGY | Facility: CLINIC | Age: 60
End: 2020-12-14

## 2020-12-14 ENCOUNTER — OFFICE VISIT - HEALTHEAST (OUTPATIENT)
Dept: CARDIOLOGY | Facility: CLINIC | Age: 60
End: 2020-12-14

## 2020-12-14 DIAGNOSIS — Z82.49 FAMILY HISTORY OF CORONARY ARTERY DISEASE: ICD-10-CM

## 2020-12-14 DIAGNOSIS — R06.09 DYSPNEA ON EXERTION: ICD-10-CM

## 2020-12-14 DIAGNOSIS — Z87.891 FORMER SMOKER: ICD-10-CM

## 2020-12-14 DIAGNOSIS — R07.9 CHEST PAIN ON EXERTION: ICD-10-CM

## 2020-12-14 DIAGNOSIS — R94.39 ABNORMAL STRESS TEST: ICD-10-CM

## 2020-12-14 DIAGNOSIS — J44.9 CHRONIC OBSTRUCTIVE PULMONARY DISEASE, UNSPECIFIED COPD TYPE (H): ICD-10-CM

## 2020-12-14 DIAGNOSIS — E78.5 HYPERLIPIDEMIA, UNSPECIFIED HYPERLIPIDEMIA TYPE: ICD-10-CM

## 2020-12-28 ENCOUNTER — AMBULATORY - HEALTHEAST (OUTPATIENT)
Dept: CARDIOLOGY | Facility: CLINIC | Age: 60
End: 2020-12-28

## 2020-12-28 DIAGNOSIS — Z11.59 ENCOUNTER FOR SCREENING FOR OTHER VIRAL DISEASES: ICD-10-CM

## 2020-12-30 ENCOUNTER — COMMUNICATION - HEALTHEAST (OUTPATIENT)
Dept: CARDIOLOGY | Facility: CLINIC | Age: 60
End: 2020-12-30

## 2020-12-30 ENCOUNTER — SURGERY - HEALTHEAST (OUTPATIENT)
Dept: CARDIOLOGY | Facility: CLINIC | Age: 60
End: 2020-12-30

## 2020-12-30 DIAGNOSIS — R94.39 ABNORMAL STRESS TEST: ICD-10-CM

## 2021-01-02 ENCOUNTER — AMBULATORY - HEALTHEAST (OUTPATIENT)
Dept: FAMILY MEDICINE | Facility: CLINIC | Age: 61
End: 2021-01-02

## 2021-01-02 DIAGNOSIS — Z11.59 ENCOUNTER FOR SCREENING FOR OTHER VIRAL DISEASES: ICD-10-CM

## 2021-01-03 LAB
SARS-COV-2 PCR COMMENT: NORMAL
SARS-COV-2 RNA SPEC QL NAA+PROBE: NEGATIVE
SARS-COV-2 VIRUS SPECIMEN SOURCE: NORMAL

## 2021-01-04 ENCOUNTER — COMMUNICATION - HEALTHEAST (OUTPATIENT)
Dept: SCHEDULING | Facility: CLINIC | Age: 61
End: 2021-01-04

## 2021-01-04 ENCOUNTER — HEALTH MAINTENANCE LETTER (OUTPATIENT)
Age: 61
End: 2021-01-04

## 2021-01-06 ENCOUNTER — SURGERY - HEALTHEAST (OUTPATIENT)
Dept: CARDIOLOGY | Facility: HOSPITAL | Age: 61
End: 2021-01-06

## 2021-01-06 ASSESSMENT — MIFFLIN-ST. JEOR: SCORE: 1662.65

## 2021-01-13 ENCOUNTER — AMBULATORY - HEALTHEAST (OUTPATIENT)
Dept: CARDIAC REHAB | Facility: HOSPITAL | Age: 61
End: 2021-01-13

## 2021-01-13 DIAGNOSIS — I20.89 STABLE ANGINA (H): ICD-10-CM

## 2021-01-19 ENCOUNTER — AMBULATORY - HEALTHEAST (OUTPATIENT)
Dept: CARDIAC REHAB | Facility: HOSPITAL | Age: 61
End: 2021-01-19

## 2021-01-19 DIAGNOSIS — I20.89 STABLE ANGINA (H): ICD-10-CM

## 2021-01-21 ENCOUNTER — AMBULATORY - HEALTHEAST (OUTPATIENT)
Dept: CARDIAC REHAB | Facility: HOSPITAL | Age: 61
End: 2021-01-21

## 2021-01-21 DIAGNOSIS — I20.89 STABLE ANGINA (H): ICD-10-CM

## 2021-01-26 ENCOUNTER — AMBULATORY - HEALTHEAST (OUTPATIENT)
Dept: CARDIAC REHAB | Facility: HOSPITAL | Age: 61
End: 2021-01-26

## 2021-01-26 DIAGNOSIS — I20.89 STABLE ANGINA (H): ICD-10-CM

## 2021-01-28 ENCOUNTER — AMBULATORY - HEALTHEAST (OUTPATIENT)
Dept: CARDIAC REHAB | Facility: HOSPITAL | Age: 61
End: 2021-01-28

## 2021-01-28 DIAGNOSIS — I20.89 STABLE ANGINA (H): ICD-10-CM

## 2021-02-02 ENCOUNTER — AMBULATORY - HEALTHEAST (OUTPATIENT)
Dept: CARDIAC REHAB | Facility: HOSPITAL | Age: 61
End: 2021-02-02

## 2021-02-02 DIAGNOSIS — I20.89 STABLE ANGINA (H): ICD-10-CM

## 2021-02-04 ENCOUNTER — AMBULATORY - HEALTHEAST (OUTPATIENT)
Dept: CARDIAC REHAB | Facility: HOSPITAL | Age: 61
End: 2021-02-04

## 2021-02-04 DIAGNOSIS — I20.89 STABLE ANGINA (H): ICD-10-CM

## 2021-02-09 ENCOUNTER — AMBULATORY - HEALTHEAST (OUTPATIENT)
Dept: CARDIAC REHAB | Facility: HOSPITAL | Age: 61
End: 2021-02-09

## 2021-02-09 DIAGNOSIS — I20.89 STABLE ANGINA (H): ICD-10-CM

## 2021-02-11 ENCOUNTER — AMBULATORY - HEALTHEAST (OUTPATIENT)
Dept: CARDIAC REHAB | Facility: HOSPITAL | Age: 61
End: 2021-02-11

## 2021-02-11 DIAGNOSIS — I20.89 STABLE ANGINA (H): ICD-10-CM

## 2021-02-26 ENCOUNTER — AMBULATORY - HEALTHEAST (OUTPATIENT)
Dept: CARDIAC REHAB | Facility: HOSPITAL | Age: 61
End: 2021-02-26

## 2021-02-26 DIAGNOSIS — I20.89 STABLE ANGINA (H): ICD-10-CM

## 2021-04-25 ENCOUNTER — HEALTH MAINTENANCE LETTER (OUTPATIENT)
Age: 61
End: 2021-04-25

## 2021-05-27 ENCOUNTER — RECORDS - HEALTHEAST (OUTPATIENT)
Dept: ADMINISTRATIVE | Facility: CLINIC | Age: 61
End: 2021-05-27

## 2021-06-01 ENCOUNTER — RECORDS - HEALTHEAST (OUTPATIENT)
Dept: ADMINISTRATIVE | Facility: CLINIC | Age: 61
End: 2021-06-01

## 2021-06-02 ENCOUNTER — RECORDS - HEALTHEAST (OUTPATIENT)
Dept: ADMINISTRATIVE | Facility: CLINIC | Age: 61
End: 2021-06-02

## 2021-06-04 ENCOUNTER — COMMUNICATION - HEALTHEAST (OUTPATIENT)
Dept: CARDIOLOGY | Facility: CLINIC | Age: 61
End: 2021-06-04

## 2021-06-05 VITALS — WEIGHT: 230 LBS | BODY MASS INDEX: 33.97 KG/M2

## 2021-06-05 VITALS — HEIGHT: 69 IN | WEIGHT: 230 LBS | BODY MASS INDEX: 34.07 KG/M2

## 2021-06-05 VITALS — BODY MASS INDEX: 33.97 KG/M2 | WEIGHT: 230 LBS

## 2021-06-05 VITALS
BODY MASS INDEX: 33.97 KG/M2 | OXYGEN SATURATION: 92 % | DIASTOLIC BLOOD PRESSURE: 70 MMHG | WEIGHT: 230 LBS | RESPIRATION RATE: 20 BRPM | HEART RATE: 82 BPM | SYSTOLIC BLOOD PRESSURE: 144 MMHG

## 2021-06-05 VITALS — BODY MASS INDEX: 34.11 KG/M2 | WEIGHT: 231 LBS

## 2021-06-05 VITALS — BODY MASS INDEX: 33.82 KG/M2 | WEIGHT: 229 LBS

## 2021-06-05 VITALS — WEIGHT: 231 LBS | BODY MASS INDEX: 34.11 KG/M2

## 2021-06-07 ENCOUNTER — OFFICE VISIT - HEALTHEAST (OUTPATIENT)
Dept: CARDIOLOGY | Facility: CLINIC | Age: 61
End: 2021-06-07

## 2021-06-07 DIAGNOSIS — Z87.891 FORMER SMOKER: ICD-10-CM

## 2021-06-07 DIAGNOSIS — J44.9 COPD (CHRONIC OBSTRUCTIVE PULMONARY DISEASE) (H): ICD-10-CM

## 2021-06-07 DIAGNOSIS — R07.9 CHEST PAIN ON EXERTION: ICD-10-CM

## 2021-06-07 DIAGNOSIS — I25.10 NONOBSTRUCTIVE ATHEROSCLEROSIS OF CORONARY ARTERY: ICD-10-CM

## 2021-06-07 DIAGNOSIS — R06.09 DOE (DYSPNEA ON EXERTION): ICD-10-CM

## 2021-06-13 NOTE — TELEPHONE ENCOUNTER
Wellness Screening Tool  Symptom Screening:  Do you have one of the following NEW symptoms:    Fever (subjective or >100.0)?  No    A new cough?  No    Shortness of breath?  No     Chills? No     New loss of taste or smell? No     Generalized body aches? No     New persistent headache? No     New sore throat? No     Nausea, vomiting, or diarrhea?  No    Within the past 2 weeks, have you been exposed to someone with a known positive illness below:    COVID-19 (known or suspected)?  No    Chicken pox?  No    Mealses?  No    Pertussis?  No    Patient notified of visitor policy- No visitors are allowed to accompany the patient at this time. Yes  Pt informed to wear a mask: Yes  Pt notified if they develop any symptoms listed above, prior to their appointment, they are to call the clinic directly at [phone] for further instructions.  Yes  Patient's appointment status: Patient will be seen in clinic as scheduled on 12/14
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
## 2021-06-14 NOTE — TELEPHONE ENCOUNTER
Dayana Valverde  [address] [place] [address]  [phone] (home)     Primary cardiologist:  Dr. Roca  PCP:  Barbie Silver MD  Procedure:  Coronary angiogram with possible percutaneous intervention   H&P completed by:  Dr. Roca12/14/2020  Case MD:  Dr. Hawley or Dr. Huff   Admit date and time:  0630  Case start time:  TBD  Ordering MD:  Dr. Roca  Diagnosis:  Abnormal nuclear stress test   Anticoagulation: None  CPAP: No  Bypass Grafts: No  Renal Issues: No  Allergies:   Allergies   Allergen Reactions     Pentobarbital Other (See Comments)     Sodium Pentothol  Patient does not wake up from this medication     Naproxen      Other: GI Upset       Diabetic?: No  Device?: No      Angiogram Teaching    Reason for Visit:  Telephone call to discuss pre-procedure education in preparation for: coronary angiogram with possible percutaneous intervention     Procedure Prep:  Cardiologist note dated: 12/14/2020  EKG results obtained, dated: on admission   Pertinent test results obtained - Viewable in Epic, dated: abnormal nuclear stress test dated 12/10/2020  Hemogram results obtained: on admission   Basic Metabolic Panel results obtained: on admission   Lipid Profile results obtained: on admission     Patient Education  Pt will hear risks upon signing of consent     Pre-procedure instructions  Patient instructed to be NPO after midnight.  Patient instructed to arrange for transportation home following procedure.  Patient instructed to have a responsible adult with them for 24 hours post-procedure.  Post-procedure follow up process.  Conscious sedation discussed.  The patient was sent the pre-procedure letter (If requested) on : n/a - not enough time     Pre-procedure medication instructions  Patient instructed on antiplatelet medication.  Continue medications as scheduled, with a small amount of water on the day of the procedure unless indicated.  Patient instructed to take 325 mg of Aspirin am of procedure:  Yes - but patient takes asa at night due to her synthroid. She will take it at night and then if needed, CSC will give aspirin in AM.  Other medication: instructed to hold all except she will take her synthroid the a.m. of the procedure.    *PATIENTS RECORDS AVAILABLE IN [facility] UNLESS OTHERWISE INDICATED*    *Order set was entered on this date: 12/30/2020      Patient Active Problem List   Diagnosis     Osteoarthrosis Of Multiple Sites     Bursitis Of The Hip     Myalgia and myositis      chronic bronchitis     H/O gastroesophageal reflux (GERD)     NSAID long-term use     Abnormal stress test     Chest pain on exertion     Dyspnea on exertion       Current Outpatient Medications   Medication Sig Dispense Refill     acetaminophen (TYLENOL) 325 MG tablet Take 650 mg by mouth as needed.       albuterol sulfate 2.5 mg/0.5 mL Nebu Inhale 2.5 mg as needed for wheezing.        biotin 1 mg tablet Take 1 capsule by mouth daily.       budesonide-formoterol (SYMBICORT) 160-4.5 mcg/actuation inhaler Inhale 2 puffs daily.       camphor-menthoL 0.2-3.5 % Gel as needed.       cholecalciferol, vitamin D3, 1,000 unit tablet Take 2,000 Units by mouth daily.       citalopram (CELEXA) 40 MG tablet Take 60 mg by mouth daily.       esomeprazole (NEXIUM) 40 MG capsule Take 40 mg by mouth every evening.       fluocinonide (LIDEX) 0.05 % external solution as needed.       FLUoxetine (PROZAC) 20 MG capsule 60 mg.       hydrochlorothiazide (HYDRODIURIL) 25 MG tablet Take 25 mg by mouth as needed.        levothyroxine (SYNTHROID) 112 MCG tablet daily.       levothyroxine (SYNTHROID, LEVOTHROID) 100 MCG tablet Take 100 mcg by mouth daily.       meloxicam (MOBIC) 15 MG tablet TAKE 1 TABLET BY MOUTH DAILY 90 tablet 0     meloxicam (MOBIC) 15 MG tablet TAKE 1 TABLET BY MOUTH EVERY DAY 90 tablet 0     metoprolol succinate (TOPROL-XL) 25 MG Take 1 tablet (25 mg total) by mouth daily. 30 tablet 1     MULTIVIT WITH CALCIUM,IRON,MIN (WOMEN'S ONE DAILY  ORAL) Take 1 tablet by mouth daily.       nitroglycerin (NITROSTAT) 0.4 MG SL tablet as needed.       omega-3 fatty acids-vitamin E (FISH OIL) 1,000 mg cap Take 1,000 mg by mouth daily.       omeprazole (PRILOSEC) 40 MG capsule TAKE 1 CAPSULE BY MOUTH EVERY DAY 90 capsule 0     omeprazole (PRILOSEC) 40 MG capsule Take 40 mg by mouth daily.       pantoprazole (PROTONIX) 40 MG tablet Take 40 mg by mouth daily.       POTASSIUM CHLORIDE ORAL Take 5 tablets by mouth daily.       rosuvastatin (CRESTOR) 40 MG tablet Take 1 tablet (40 mg total) by mouth at bedtime. 90 tablet 3     tiotropium bromide (SPIRIVA RESPIMAT) 2.5 mcg/actuation Mist daily.       triamcinolone (KENALOG) 0.1 % ointment as needed.       No current facility-administered medications for this visit.        Allergies   Allergen Reactions     Pentobarbital Other (See Comments)     Sodium Pentothol  Patient does not wake up from this medication     Naproxen      Other: GI Upset         Plan  Pt's , Kj, will be her . No mobility concerns. Covid swab will be 1/2.   Patient ready for procedure    LEMUEL Singh

## 2021-06-14 NOTE — TELEPHONE ENCOUNTER
----- Message from Marcela Wallace sent at 12/28/2020  4:56 PM CST -----  Regarding: BEW ORDERING  CA POSS PCI W/APPLE/RABIA  630AM ADMIT ON 1/5/21  H&P-12/14    Thank you!Marcela CHAUHAN for patient to call back to go over procedure instructions. -[facility] [place]

## 2021-06-14 NOTE — PROGRESS NOTES
ITP ASSESSMENT   Assessment Day: Initial    Session Number: 1-2  Precautions: Stable Angina    No data recorded  Risk Stratification: Medium    Referring Provider: Nimesh Hawley,*  EXERCISE  Exercise Assessment: Initial       6 Minute Walk Test   Pre   Pre Exercise HR: 76                    Pre Exercise BP: 132/81      Peak  Peak HR: 110                   Peak BP: 182/80    Peak feet: 1020    Peak O2 SAT: 84    Peak RPE: 6    Peak MPH: 1.93      Symptoms:  Peak Symptoms: hip discomfort, mild SOB      5 mins. Post  5 Min Post HR: 80    5 Min Post BP: 129/74                           Exercise Plan  Goals Next 30 days  STG #1:  Pt to tolerate 30-40 min of exercise at 2.2-2.7 METS with no sx's, to resume moderate housework, carry groceries and go up stairs with no sx's.     STG #2:  Pt to lose 1-3 lbs in the next month with heart healthy diet and daily exercise.    LTG:  Resume travel,  go camping, heavy housework and yardwork.      Education Goals: All goals in this section met    Education Goals Met: Patient can state cardiac s/s and appropriate emergency response.;Has system for taking medication.;Medication review.      Exercise Prescription  Exercise Mode: Treadmill;Bike;Nustep;Arm Erg.;Stairs;Hallway Walking    Frequency: 2x/week    Duration: 30-40 min    Intensity / THR:  (102-126 BPM (60-80% APR))    RPE 11-14  Progression / Met level: 2.2-2.7    Resistive Training?: Yes      Current Exercise (mins/week): 5      Interventions  Home Exercise:  Mode: Walk, TM    Frequency: 3-4 days/week    Duration: 10 min TID      Education Material : Educational videos;Provide written material;Individual education and counseling      Education Completed  Exercise Education Completed: Cardiac Anatomy;Signs and Symptoms;Medication review;RPE;Emergency Plan;Home Exercise;Warm up/cool down;FITT Principles;BP/HR Reponse to exercise;Benefits of Exercise;End point of exercise              Exercise Follow-up/Discharge  Follow  "up/Discharge: Skilled therapy is needed to monitor CV response, O2 levels and for any EKG changes during exercise.  Pt is de-conditioned but is motivated to improve.  Education and support will be provided to manage risk factors along with modifications for exercise with COPD and arthritis limitations.   NUTRITION  Nutrition Assessment: Initial      Nutrition Risk Factors:  Nutrition Risk Factors: Dyslipidemia;Overweight  Cholesterol: 120  LDL: 65  HDL: 40  Triglycerides: 77      Nutrition Plan  Interventions  Other Nutrition Intervention: Therapist/Pt Discussion;Educational Videos;Provide with Written Material    Initial Rate Your Plate Score: 49      Education Completed  Nutrition Education Completed: Risk factor overview;Weight management      Goals  Nutrition Goals (Next 30 days): Patient will follow a low sodium diet;Provide Rate your Plate Survey;Review Dietitian schedule;Patient will follow a low saturated fat diet;Patient will lose weight;Patient can identify their risk factors for CAD;Patient knows appropriate portion size      Goals Met  Nutrition Goals Met: Patient can identify their risk factors for CAD      Height, Weight, and  BMI  Weight: 231 lb (104.8 kg)  Height: 5' 9\" (1.753 m)  BMI: 34.1      Nutrition Follow-up  Follow-up/Discharge: Diet consult to be scheduled, is motivated to continue working on wt loss (reports she has lost 30# in the last year)         Other Risk Factors  Other Risk Factor Assessment: Initial      HTN Risk Factor: NA      Pre Exercise BP: 132/81  Post Exercise BP: 143/74      Tobacco Risk Factor: NA      Risk Factor Follow-up   Follow-up/Discharge: BP's are elevated today, will continue to monitor.     PSYCHOSOCIAL  Psychosocial Assessment: Initial       [facility] Q of L Summary Score: 22      PHQ-9 Total Score: 3      Psychosocial Risk Factor: Stress      Psychosocial Plan  Interventions  If PHQ-9 is >9, send letter to MD  Interventions: Offer Social Service " consult;Offer educational videos and classes;Individual education and counseling;Provide written material       Education Completed  Education Completed: S/S of depression;Effects of stress on body;Relaxation/Coping Techniques      Goals  Goals (Next 30 days): Practicing stress management skills      Goals Met  Goals Met: Identified Support system;Identify stressors      Psychosocial Follow-up  Follow-up/Discharge: Pt states she feels her depression is well controlled with meds, and feels hopeful about feeling good and improving her health.             Patient involved in Goal setting?: Yes      Signature: _____________________________________________________________    Date: __________________    Time: __________________See Doc Flowsheet

## 2021-06-15 NOTE — PROGRESS NOTES
Dayana Valverde has participated in 16   sessions of Phase II Cardiac Rehab.    Progress Report:   Cardiac Rehab Treatment Progress Report 2/2/2021 2/4/2021 2/9/2021 2/11/2021 2/26/2021   Weight 231 lbs 230 lbs 231 lbs 230 lbs -   Pre Exercise  HR 89 98 76 74 -   Pre Exercise /74 122/64 124/78 126/68 -   Treadmill Peak  119 104 - -   Nustep Peak Heart Rate 103 103 105 87 -   Nustep Peak Blood Pressure 168/70 134/70 142/72 140/68 -   Heart Rate 85 91 86 76 -   Post Exercise /70 104/64 106/70 120/66 -   ECG SR/ST SR/ST SR/ST SR -   Total Exercise Minutes 40 40 40 40 6         Current Status:  Currently exercising without complaints or symptoms.    If Physician recommends change in treatment plan, please place orders.        __________________________________________________      _____________  Signature                                                                                                  DateSee Doc Flowsheet

## 2021-06-15 NOTE — PROGRESS NOTES
ITP ASSESSMENT   Assessment Day: 30 Day    Session Number: 10  Precautions: Stable angina/COPD    Diagnosis: Stable Angina    Risk Stratification: High    Referring Provider: Barbie Silver MD  EXERCISE  Exercise Assessment: Reassessment                           Exercise Plan  Goals Next 30 days  : STG #1:  Pt to tolerate 30-40 min of exercise at 2.6-3 METs with no sx's by 3/7/21     STG #2:  Pt to lose 1-3 lbs by 3/7/21    LTG:  Resume travel,  go camping, heavy housework and yardwork.      Education Goals: All goals in this section met    Education Goals Met: Patient can state cardiac s/s and appropriate emergency response.;Has system for taking medication.;Medication review.                          Goals Met  Initial ADL's goals met: Pt has reached 2.6 METs - goal met    Initial Leisure goals met: Pt has not lost wt, goal not met    Initial Progression: Pt is progressing well, limited by dsypnea      Exercise Prescription  Exercise Mode: Treadmill;Bike;Nustep;Arm Erg.;Stairs;Hallway Walking    Frequency: 2x/week    Duration: 30-40 minutes    Intensity / THR: 20-30 beats above resting heart rate    RPE 11-14  Progression / Met level: 2.6-3    Resistive Training?: Yes      Current Exercise (mins/week): 120      Interventions  Home Exercise:  Mode: TM    Frequency: 3-4x/week    Duration: 20-30 minutes      Education Material : Educational videos;Provide written material;Individual education and counseling;Offer educational classes      Education Completed  Exercise Education Completed: Cardiac Anatomy;Signs and Symptoms;Medication review;RPE;Emergency Plan;Home Exercise;Warm up/cool down;FITT Principles;BP/HR Reponse to exercise;Benefits of Exercise;End point of exercise              Exercise Follow-up/Discharge  Follow up/Discharge: Skilled therapy is needed to monitor CV response, O2 levels and for any EKG changes during exercise.  Pt is de-conditioned but is motivated to improve.  Education and support will  "be provided to manage risk factors along with modifications for exercise with COPD and arthritis limitations.           NUTRITION  Nutrition Assessment: Reassessment      Nutrition Risk Factors:  Nutrition Risk Factors: Dyslipidemia;Overweight  Cholesterol: 120  LDL: 65  HDL: 40  Triglycerides: 77      Nutrition Plan  Interventions  Diet Consult: Completed    Other Nutrition Intervention: Therapist/Pt Discussion;Provide with Written Material;Diet Class;Educational Videos    Initial Rate Your Plate Score: 49      Education Completed  Nutrition Education Completed: Low Saturated fat diet;Risk factor overview;Low sodium diet;Weight management      Goals  Nutrition Goals (Next 30 days): Patient will follow a low sodium diet;Patient will follow a low saturated fat diet      Goals Met  Nutrition Goals Met: Patient can identify their risk factors for CAD;Completed Nutritional Risk Screen;Provided Rate your Plate Survey;Reviewed Dietitian schedule      Height, Weight, and  BMI  Weight: 231 lb (104.8 kg)  Height: 5' 9\" (1.753 m)  BMI: 34.1      Nutrition Follow-up  Follow-up/Discharge: RD available for questions as needed       Other Risk Factors  Other Risk Factor Assessment: Reassessment      HTN Risk Factor: NA      Pre Exercise BP: 124/78  Post Exercise BP: 104/64      Tobacco Risk Factor: NA      Risk Factor Follow-up   Follow-up/Discharge: Reviewed risk factors, will continue to provide support and education         PSYCHOSOCIAL  Psychosocial Assessment: Reassessment       [facility] RADHA Q of L Summary Score: 22      PHQ-9 Total Score: 3      Psychosocial Risk Factor: Stress      Psychosocial Plan  Interventions  Interventions: Offer Social Service consult;Offer educational videos and classes;Individual education and counseling;Provide written material      Education Completed  Education Completed: S/S of depression;Effects of stress on body;Relaxation/Coping Techniques      Goals  Goals (Next 30 days): Practicing " stress management skills      Goals Met  Goals Met: Identified Support system;Identify stressors;Oriented to stress management classes      Psychosocial Follow-up  Follow-up/Discharge: Reviewed effects of stress on the heart and importance of stress mgmt           Patient involved in Goal setting?: Yes      Signature: _____________________________________________________________    Date: __________________    Time: __________________

## 2021-06-15 NOTE — PROGRESS NOTES
ITP ASSESSMENT   Assessment Day: 60 Day/Discharge Note:    Session Number: 16  Precautions: Stable angina/COPD    Diagnosis: Stable Angina    Risk Stratification: High    Referring Provider: Barbie Silver MD  EXERCISE  Exercise Assessment: Discharge       6 Minute Walk Test   Pre   Pre Exercise HR: 73                    Pre Exercise BP: 124/64      Peak  Peak HR: 106                   Peak BP: 144/84    Peak feet: 1100    Peak O2 SAT: 84    Peak RPE: 4    Peak MPH: 2.08      Symptoms:  Peak Symptoms: NONE      5 mins. Post  5 Min Post HR: 102    5 Min Post BP: 124/78                           Exercise Plan  Goals Next 30 days   Consistent home exercise Program     STG #2:  Pt to lose 1-3 lbs by 3/7/21    Work: LTG:  Resume travel,  go camping, heavy housework and yardwork.      Education Goals: All goals in this section met    Education Goals Met: Patient can state cardiac s/s and appropriate emergency response.;Has system for taking medication.;Medication review.                          Goals Met  30 day ADL'S goals met: Pt is tolerating 40 mins of ex at 2.3 mets    30 day Leisure goals met: Pt has not lost weight    30 Day Progression: Pt is tolerating Moderate household tasks;  Bought a stationary  bike for home exercise      Initial ADL's goals met: Pt has reached 2.6 METs - goal met    Initial Leisure goals met: Pt has not lost wt, goal not met    No data recorded  Initial Progression: Pt is progressing well, limited by dsypnea      Exercise Prescription  Exercise Mode: Treadmill;Bike;Nustep;Arm Erg.;Stairs;Hallway Walking    Frequency: 2x/week    Duration: 30-40 minutes    Intensity / THR: 20-30 beats above resting heart rate    RPE 11-14  Progression / Met level: 2.6-3    Resistive Training?: Yes      Current Exercise (mins/week): 120      Interventions  Home Exercise:  Mode: Stationary bike/ Treadmill    Frequency: 5-7 days per week    Duration: 30-45 mins      Education Material : Educational  "videos;Provide written material;Individual education and counseling;Offer educational classes      Education Completed  Exercise Education Completed: Cardiac Anatomy;Signs and Symptoms;Medication review;RPE;Emergency Plan;Home Exercise;Warm up/cool down;FITT Principles;BP/HR Reponse to exercise;Benefits of Exercise;End point of exercise              Exercise Follow-up/Discharge  Follow up/Discharge: Pt has a high co-pay  Requested to be done with rehab;  Reviewed HP; SX of Intolerance; Emergency Plan  Goals met   NUTRITION  Nutrition Assessment: Discharge      Nutrition Risk Factors:  Nutrition Risk Factors: Dyslipidemia;Overweight  Cholesterol: 120  LDL: 65  HDL: 40  Triglycerides: 77      Nutrition Plan  Interventions  Diet Consult: Completed    Other Nutrition Intervention: Therapist/Pt Discussion;Provide with Written Material;Diet Class;Educational Videos    Initial Rate Your Plate Score: 49    Follow-Up Rate Your Plate Score: 54      Education Completed  Nutrition Education Completed: Low Saturated fat diet;Risk factor overview;Low sodium diet;Weight management      Goals  Nutrition Goals (Next 30 days): Patient will follow a low sodium diet;Patient will follow a low saturated fat diet      Goals Met  Nutrition Goals Met: Patient can identify their risk factors for CAD;Completed Nutritional Risk Screen;Provided Rate your Plate Survey;Reviewed Dietitian schedule      Height, Weight, and  BMI  Weight: 234 lb (106.1 kg)  Height: 5' 9\" (1.753 m)  BMI: 34.54      Nutrition Follow-up  Follow-up/Discharge: RD available for questions as needed         Other Risk Factors  Other Risk Factor Assessment: Discharge      HTN Risk Factor: NA      Pre Exercise BP: 126/68  Post Exercise BP: 120/66        Tobacco Risk Factor: NA        Risk Factor Follow-up   Follow-up/Discharge: Reports no questions re; Risk factors     PSYCHOSOCIAL  Psychosocial Assessment: Discharge       [facility] RADHA Q of L Summary Score: 17      PHQ-9 " Total Score: 2      Psychosocial Risk Factor: Stress      Psychosocial Plan  Interventions  Interventions: Offer Social Service consult;Offer educational videos and classes;Individual education and counseling;Provide written material      Education Completed  Education Completed: S/S of depression;Effects of stress on body;Relaxation/Coping Techniques      Goals  Goals (Next 30 days): Practicing stress management skills      Goals Met  Goals Met: Identified Support system;Identify stressors;Oriented to stress management classes      Psychosocial Follow-up  Follow-up/Discharge: Reports she is dealing with stress well             Patient involved in Goal setting?: Yes      Signature: _____________________________________________________________    Date: __________________    Time: _________________

## 2021-06-18 NOTE — PATIENT INSTRUCTIONS - HE
Patient Instructions by Joe Roca MD at 12/14/2020 10:30 AM     Author: Joe Roca MD Service: -- Author Type: Physician    Filed: 12/14/2020 11:25 AM Encounter Date: 12/14/2020 Status: Signed    : Joe Roca MD (Physician)       1. My nurse will contact you to schedule a date for your angiogram.  2. We will also get an ultrasound of the heart (echocardiogram).  3. We will start a cholesterol medicine called rosuvastatin (generic Crestor).  4. We will also start a heart medicine called metoprolol. If you notice new dizziness or fatigue with this medication, let me know.  5. You should also take low dose (81 mg) aspirin every day, which you can purchase over the counter.  6. Return to see me in 3 months.     Patient Education     Coronary Angiography     The catheter can be placed into the groin, arm, or wrist.     Angiography is a special type of X-ray that lets your doctor view your coronary arteries to see if the blood vessels to your heart are narrowed or blocked.   Before the procedure    Tell your doctor what medicines you take and any allergies you may have.    Tell your doctor if you've had a reaction to contrast dye or have had any kidney problems.    Dont eat or drink anything for at least 6 to 8 hours before the procedure. You will likely be told not to have anything after midnight, the night before the procedure.    A nurse will place an IV catheter in your vein to give fluids, and medicine to relieve pain and help you feel less anxious.    He or she will clean your skin and, if necessary, shave the area where the catheter will be inserted.  During the procedure    Your doctor will place a long, thin tube called a catheter inside an artery in your groin or arm and guide it into your heart.    He or she will inject a contrast dye through the catheter into your blood vessels or heart chambers.    X-rays are taken to show images of the inside of your heart and coronary  arteries.  After the procedure    Your doctor or nurse will tell you how long to lie down and keep the insertion site still.    If the insertion site was in your groin, you may need to lie down with your leg still for several hours. If bleeding occurs, a nurse will apply pressure to the area to control it.    A nurse will check your blood pressure and the insertion site frequently to make sure you remain stable after the procedure.    You may be asked to drink fluid to help flush the contrast liquid out of your system.    Have someone drive you home from the hospital.    If your doctor uses angioplasty to treat a blocked artery, you will stay the night in the hospital.    Its normal to find a small bruise or lump at the insertion site. The lump may be the collagen plug or stitch that you feel, or a small bruise. These common side effects should disappear within a few weeks.  When to call your healthcare provider  Contact your healthcare provider right away if you have any of these:    Chest pain    Pain, swelling, redness, bleeding, or drainage at the insertion site    Severe pain, coldness, or a bluish color in the leg or arm that held the catheter    Fever over 100.4 F (38 C)  Date Last Reviewed: 6/1/2016 2000-2019 [employer]. [address] [place] [address], [place] [address]. All rights reserved. This information is not intended as a substitute for professional medical care. Always follow your healthcare professional's instructions.

## 2021-06-30 NOTE — PROGRESS NOTES
Progress Notes by Joe Roca MD at 12/14/2020 10:30 AM     Author: Joe Roca MD Service: -- Author Type: Physician    Filed: 12/14/2020  1:58 PM Encounter Date: 12/14/2020 Status: Signed    : Joe Roca MD (Physician)           Thank you, Barbie Bridges MD, for asking the [facility] team to see Ms. Dayana Valverde to evaluate an abnormal stress test.      Assessment/Recommendations   Assessment:    1. Exertional chest pain with abnormal stress test. Concerning for obstructive coronary artery disease.  2. Dyspnea on exertion.  3. Former smoker.  4. COPD.  5. Hyperlipidemia. Last .  6. Family history of premature coronary artery disease.    Plan:  1. Refer for cardiac catheterization. Explained the risks and benefits of the procedure to the patient. She demonstrates understanding and wishes to proceed.  2. TTE.  3. Start metoprolol succinate 25 mg daily.  4. Start rosuvastatin 40 mg daily. Will need follow-up lipid panel and ALT in 2-3 months  5. Aspirin 81 mg daily.  6. SL nitroglycerin prn, for which she already has a prescription.  7. Follow-up in 3 months.          History of Present Illness   Ms. Dayana Valverde is a 60 y.o. female with a significant past history of COPD and prior smoking presenting with several weeks of exertional dyspnea and non-radiating substernal chest discomfort. She has noticed she gets these symptoms when she goes up the stairs or carries anything heavy. She has had similar symptoms in the past which she has attributed to COPD, but these symptoms are much more persistent. No associated nausea, lightheadedness, or diaphoresis.     Other than noted above, Ms. Valverde denies any pre-syncope, syncope, lower extremity swelling, palpitations, paroxysmal nocturnal dyspnea (PND), or orthopnea.     Cardiac Problems and Cardiac Diagnostics     Most Recent Cardiac testing:  ECG dated 12/10/2020 from stress test baseline  (personaly reviewed and interpreted): SR, nonspecific ST abnormality in inferior leads    Stress test: dated 12/10/2020 revealed   ?  The nuclear stress test is abnormal. Stress to rest cavity ratio is 1.90 which is a nonspecific finding but may be seen in patients with multivessel disease..  ?  Nuclear images suggest a small to medium size area of ischemia in the mid to distal anteroseptal and anterior wall although specificity reduced given breast attenuation.  ?  The left ventricular ejection fraction at stress is greater than 70% with mild hypokinesis of the distal anteroseptal wall..  ?  No prior study for comparison.         Medications  Allergies   Current Outpatient Medications   Medication Sig Dispense Refill   ? acetaminophen (TYLENOL) 325 MG tablet Take 650 mg by mouth as needed.     ? albuterol sulfate 2.5 mg/0.5 mL Nebu Inhale 2.5 mg as needed for wheezing.      ? biotin 1 mg tablet Take 1 capsule by mouth daily.     ? budesonide-formoterol (SYMBICORT) 160-4.5 mcg/actuation inhaler Inhale 2 puffs daily.     ? camphor-menthoL 0.2-3.5 % Gel as needed.     ? cholecalciferol, vitamin D3, 1,000 unit tablet Take 2,000 Units by mouth daily.     ? fluocinonide (LIDEX) 0.05 % external solution as needed.     ? FLUoxetine (PROZAC) 20 MG capsule 60 mg.     ? hydrochlorothiazide (HYDRODIURIL) 25 MG tablet Take 25 mg by mouth as needed.      ? levothyroxine (SYNTHROID) 112 MCG tablet daily.     ? meloxicam (MOBIC) 15 MG tablet TAKE 1 TABLET BY MOUTH DAILY 90 tablet 0   ? nitroglycerin (NITROSTAT) 0.4 MG SL tablet as needed.     ? omeprazole (PRILOSEC) 40 MG capsule TAKE 1 CAPSULE BY MOUTH EVERY DAY 90 capsule 0   ? omeprazole (PRILOSEC) 40 MG capsule Take 40 mg by mouth daily.     ? POTASSIUM CHLORIDE ORAL Take 5 tablets by mouth daily.     ? tiotropium bromide (SPIRIVA RESPIMAT) 2.5 mcg/actuation Mist daily.     ? triamcinolone (KENALOG) 0.1 % ointment as needed.     ? citalopram (CELEXA) 40 MG tablet Take 60 mg by  mouth daily.     ? esomeprazole (NEXIUM) 40 MG capsule Take 40 mg by mouth every evening.     ? levothyroxine (SYNTHROID, LEVOTHROID) 100 MCG tablet Take 100 mcg by mouth daily.     ? meloxicam (MOBIC) 15 MG tablet TAKE 1 TABLET BY MOUTH EVERY DAY 90 tablet 0   ? MULTIVIT WITH CALCIUM,IRON,MIN (WOMEN'S ONE DAILY ORAL) Take 1 tablet by mouth daily.     ? omega-3 fatty acids-vitamin E (FISH OIL) 1,000 mg cap Take 1,000 mg by mouth daily.     ? pantoprazole (PROTONIX) 40 MG tablet Take 40 mg by mouth daily.       No current facility-administered medications for this visit.       Allergies   Allergen Reactions   ? Pentobarbital Other (See Comments)     Sodium Pentothol  Patient does not wake up from this medication   ? Naproxen      Other: GI Upset          Physical Examination Review of Systems   Vitals:    12/14/20 1046   BP: 144/70   Pulse: 82   Resp: 20   SpO2: 92%     Body mass index is 34.46 kg/m .  Wt Readings from Last 3 Encounters:   12/14/20 (!) 230 lb (104.3 kg)   10/20/15 (!) 223 lb (101.2 kg)   07/22/15 (!) 224 lb 11.2 oz (101.9 kg)       General Appearance:   Pleasant  female, appears  stated age. no acute distress, normal body habitus   ENT/Mouth: membranes moist, no apparent gingival bleeding.      EYES:  no scleral icterus, normal conjunctivae   Neck: no carotid bruits. No anterior cervical lymphadenopaty   Respiratory:   lungs are clear to auscultation, no rales or wheezing,  equal chest wall expansion    Cardiovascular:   Regular rhythm,  rate. Normal first and second heart sounds with no murmurs, rubs, or gallops; the carotid, radial and posterior tibial pulses are intact, Jugular venous pressure normal, no edema bilaterally    Abdomen/GI:  no organomegaly, masses, bruits, or tenderness; bowel sounds are present   Extremities: no cyanosis or clubbing   Skin: no xanthelasma, warm.    Heme/lymph/ Immunology No apparent bleeding noted.   Neurologic: Alert and oriented. normal gait, no tremors      Psychiatric: Pleasant, calm, appropriate affect.    A complete 10 system review of systems was performed and is negative except as mentioned in the HPI or below:  General: Weight Loss  Eyes: WNL  Ears/Nose/Throat: WNL  Lungs: Shortness of Breath  Heart: Shortness of Breath with activity, Arm Pain  Stomach: Heartburn  Bladder: WNL  Muscle/Joints: Joint Pain, Muscle Weakness, Muscle Pain  Skin: WNL  Nervous System: Daytime Sleepiness(light headed)  Mental Health: Depression     Blood: WNL       Past History   Past Medical History:   1. COPD  2. GERD.  3. Hypothyroidism    Past Surgical History:   No past surgical history on file.    Family History:   Family History   Problem Relation Age of Onset   ? Rheum arthritis Unknown    ? Osteoarthritis Mother    ? Arthritis Unknown    ? Cancer Unknown    ? Leukemia Unknown    ? Stroke Father    ? Heart disease Father    ? Hypertension Mother    ? Diabetes Unknown    ? Goiter Mother     Sister who had cardiac arrest in her 30s. Another sister with heart disease in her 50s.     Social History:   Social History     Socioeconomic History   ? Marital status:      Spouse name: Not on file   ? Number of children: Not on file   ? Years of education: Not on file   ? Highest education level: Not on file   Occupational History   ? Not on file   Social Needs   ? Financial resource strain: Not on file   ? Food insecurity     Worry: Not on file     Inability: Not on file   ? Transportation needs     Medical: Not on file     Non-medical: Not on file   Tobacco Use   ? Smoking status: Former Smoker     Quit date: 2008     Years since quittin.9   ? Smokeless tobacco: Never Used   Substance and Sexual Activity   ? Alcohol use: No   ? Drug use: Not on file   ? Sexual activity: Not on file   Lifestyle   ? Physical activity     Days per week: Not on file     Minutes per session: Not on file   ? Stress: Not on file   Relationships   ? Social connections     Talks on phone: Not on  file     Gets together: Not on file     Attends [employer] service: Not on file     Active member of club or organization: Not on file     Attends meetings of clubs or organizations: Not on file     Relationship status: Not on file   ? Intimate partner violence     Fear of current or ex partner: Not on file     Emotionally abused: Not on file     Physically abused: Not on file     Forced sexual activity: Not on file   Other Topics Concern   ? Not on file   Social History Narrative   ? Not on file              Lab Results    Chemistry/lipid CBC Cardiac Enzymes/BNP/TSH/INR   Lab Results   Component Value Date    CHOL 255 (H) 05/14/2018    HDL 45 (L) 05/14/2018    LDLCALC 170 (H) 05/14/2018    TRIG 200 (H) 05/14/2018    CREATININE 0.83 12/27/2019    BUN 13 12/27/2019    K 4.7 12/27/2019     12/27/2019     12/27/2019    CO2 27 12/27/2019    Lab Results   Component Value Date    WBC 6.1 05/06/2015    HGB 14.0 05/06/2015    HCT 40.6 05/06/2015    MCV 91 05/06/2015     05/06/2015    Lab Results   Component Value Date    CKTOTAL 267 (H) 03/10/2015    TSH 1.17 11/25/2020    INR 0.90 01/12/2017        Joe Roca MD [facility]  Non-Invasive Cardiologist  [facility]  Pager [phone]

## 2021-07-04 NOTE — PROGRESS NOTES
Progress Notes by Joe Roca MD at 6/7/2021  1:30 PM     Author: Joe Roca MD Service: -- Author Type: Physician    Filed: 6/7/2021  2:47 PM Encounter Date: 6/7/2021 Status: Signed    : Joe Roca MD (Physician)             Assessment/Recommendations   Assessment:    1. Nonobstructive coronary artery disease. Not having anginal symptoms.  2. Benign essential hypertension.  3. Hyperlipidemia. Last LDL 65.  4. Chronic obstructive pulmonary disease.  5. Former smoker.    Plan:  1. Reviewed angiogram and echocardiogram results with the patient and instructed her to let us know if she develops recurrent exertional chest discomfort..  2. Will continue isosorbide mononitrate 30 mg daily. She reported worsened symptoms on metoprolol.  3. Continue rosuvastatin 40 mg daily.  4. Aspirin 81 mg daily.  5. Follow-up in 1 year.         History of Present Illness   Ms. Dayana Valverde is a 61 y.o. female with a significant past history of COPD and former smoker presenting for follow-up. She initially saw me for exertional dyspnea and chest discomfort, with a nuclear stress test suggesting ischemia. She was started on rosuvastatin and metoprolol and went for coronary angiography on 1/6/2021. A 60% mid RCA stenosis was found, but negative by FFR at 0.87. As she noted her symptoms seemed worse on metoprolol, this was switched to isosorbide mononitrate due to concern for coronary vasospasm. TTE was normal.    She feels better and is not having chest pain. She still gets out of breath, but this seems better too. She denies light headedness/dizziness, pre-syncope, syncope, lower extremity swelling, palpitations, paroxysmal nocturnal dyspnea (PND), or orthopnea.     Cardiac Problems and Cardiac Diagnostics     Most Recent Cardiac testing:  ECG dated 1/6/2021 (personaly reviewed and interpreted): sinus bradycardia, otherwise normal ECG    ECHO (report reviewed):   Echo results:   Results for orders  placed during the hospital encounter of 01/06/21   Echo Complete [ECH10] 01/06/2021    Narrative 1. Normal left ventricular size and systolic performance with a visually   estimated ejection fraction of 60%.   2. No significant valvular heart disease is identified on this study.   3. Normal right ventricular size and systolic performance.        Stress test 12/10/2020 (report reviewed):   ?  The nuclear stress test is abnormal. Stress to rest cavity ratio is 1.90 which is a nonspecific finding but may be seen in patients with multivessel disease..  ?  Nuclear images suggest a small to medium size area of ischemia in the mid to distal anteroseptal and anterior wall although specificity reduced given breast attenuation.  ?  The left ventricular ejection fraction at stress is greater than 70% with mild hypokinesis of the distal anteroseptal wall..  ?  No prior study for comparison.    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 acetaminophen (TYLENOL) 325 MG tablet Take 650 mg by mouth as needed.     ? albuterol sulfate 2.5 mg/0.5 mL Nebu Inhale 2.5 mg as needed for wheezing.      ? aspirin 81 mg chewable tablet Chew 1 tablet (81 mg total) daily.  0   ? biotin 1 mg tablet Take 1 capsule by mouth daily.     ? budesonide-formoterol (SYMBICORT) 160-4.5 mcg/actuation inhaler Inhale 2 puffs daily.     ? camphor-menthoL 0.2-3.5 % Gel as needed.     ? cholecalciferol, vitamin D3, 1,000 unit (25 mcg) tablet Take 5,000 Units by mouth daily.     ? esomeprazole (NEXIUM) 40 MG capsule Take 40 mg by mouth every evening.     ? fluocinonide (LIDEX) 0.05 % external solution as needed.     ? FLUoxetine (PROZAC) 40 MG capsule  Take 40 mg by mouth every evening.     ? hydrochlorothiazide (HYDRODIURIL) 25 MG tablet Take 25 mg by mouth as needed.      ? isosorbide mononitrate (IMDUR) 30 MG 24 hr tablet Take 1 tablet (30 mg total) by mouth daily. 90 tablet 11   ? levothyroxine (SYNTHROID) 112 MCG tablet daily.     ? meloxicam (MOBIC) 15 MG tablet TAKE 1 TABLET BY MOUTH DAILY 90 tablet 0   ? nitroglycerin (NITROSTAT) 0.4 MG SL tablet as needed.     ? omeprazole (PRILOSEC) 40 MG capsule TAKE 1 CAPSULE BY MOUTH EVERY DAY 90 capsule 0   ? POTASSIUM CHLORIDE ORAL Take 5 tablets by mouth daily.     ? rosuvastatin (CRESTOR) 40 MG tablet Take 1 tablet (40 mg total) by mouth at bedtime. 90 tablet 3   ? tiotropium bromide (SPIRIVA RESPIMAT) 2.5 mcg/actuation Mist daily.     ? triamcinolone (KENALOG) 0.1 % ointment as needed.       No current facility-administered medications for this visit.       Allergies   Allergen Reactions   ? Pentobarbital Other (See Comments)     Sodium Pentothol  Patient does not wake up from this medication   ? Naproxen      Other: GI Upset          Physical Examination Review of Systems   Vitals:    06/07/21 1344   BP: 126/70   Pulse: 72   Resp: 16     Body mass index is 34.17 kg/m .  Wt Readings from Last 3 Encounters:   06/07/21 (!) 231 lb 6.4 oz (105 kg)   02/11/21 (!) 230 lb (104.3 kg)   02/09/21 (!) 231 lb (104.8 kg)       General Appearance:   Pleasant  female, appears  stated age. no acute distress, normal body habitus   ENT/Mouth: Facemask.    EYES:  no scleral icterus, normal conjunctivae   Neck: no carotid bruits. No anterior cervical lymphadenopaty   Respiratory:   lungs are clear to auscultation, no rales or wheezing, equal chest wall expansion    Cardiovascular:   Regular rhythm, normal rate. Normal first and second heart sounds with no murmurs, rubs, or gallops; the carotid, radial and posterior tibial pulses are intact, Jugular venous pressure normal, no edema bilaterally    Abdomen/GI:  no organomegaly, masses,  bruits, or tenderness; bowel sounds are present   Extremities: no cyanosis or clubbing   Skin: no xanthelasma, warm.    Heme/lymph/ Immunology No apparent bleeding noted.   Neurologic: Alert and oriented. normal gait, no tremors     Psychiatric: Pleasant, calm, appropriate affect.    A complete 10 system review of systems was performed and is negative except as mentioned in the HPI or below:                                              Past History   Past Medical History:   Past Medical History:   Diagnosis Date   ? Cancer (H)     Melanoma   ? COPD (chronic obstructive pulmonary disease) (H)     per chart   ? Dyspnea on exertion    ? GERD (gastroesophageal reflux disease)    ? Hyperlipidemia    ? Hypothyroidism        Past Surgical History:   Past Surgical History:   Procedure Laterality Date   ? CV CORONARY ANGIOGRAM N/A 1/6/2021    Procedure: Coronary Angiogram;  Surgeon: Nimesh Hawley MD;  Location: [facility] Cardiac Cath Lab;  Service: Cardiology   ? CV LEFT HEART CATHETERIZATION WO LEFT VETRICULOGRAM Left 1/6/2021    Procedure: Left Heart Catheterization Without Left Ventriculogram;  Surgeon: Nimesh Hawley MD;  Location: [facility] Cardiac Cath Lab;  Service: Cardiology   ? HYSTERECTOMY     ? TOTAL HIP ARTHROPLASTY Right 2017       Family History:   Family History   Problem Relation Age of Onset   ? Rheum arthritis Other    ? Osteoarthritis Mother    ? Hypertension Mother    ? Goiter Mother    ? Arthritis Other    ? Cancer Other    ? Leukemia Other    ? Stroke Father    ? Heart disease Father    ? Diabetes Other        Social History:   Social History     Socioeconomic History   ? Marital status:      Spouse name: Not on file   ? Number of children: Not on file   ? Years of education: Not on file   ? Highest education level: Not on file   Occupational History   ? Not on file   Social Needs   ? Financial resource strain: Not on file   ? Food insecurity     Worry: Not on file      Inability: Not on file   ? Transportation needs     Medical: Not on file     Non-medical: Not on file   Tobacco Use   ? Smoking status: Former Smoker     Quit date: 2008     Years since quittin.4   ? Smokeless tobacco: Never Used   Substance and Sexual Activity   ? Alcohol use: No   ? Drug use: Not on file   ? Sexual activity: Not on file   Lifestyle   ? Physical activity     Days per week: Not on file     Minutes per session: Not on file   ? Stress: Not on file   Relationships   ? Social connections     Talks on phone: Not on file     Gets together: Not on file     Attends [employer] service: Not on file     Active member of club or organization: Not on file     Attends meetings of clubs or organizations: Not on file     Relationship status: Not on file   ? Intimate partner violence     Fear of current or ex partner: Not on file     Emotionally abused: Not on file     Physically abused: Not on file     Forced sexual activity: Not on file   Other Topics Concern   ? Not on file   Social History Narrative   ? Not on file              Lab Results    Chemistry/lipid CBC Cardiac Enzymes/BNP/TSH/INR   Lab Results   Component Value Date    CHOL 120 2021    HDL 40 (L) 2021    LDLCALC 65 2021    TRIG 77 2021    CREATININE 0.88 2021    BUN 11 2021    K 3.7 2021     2021     2021    CO2 29 2021    Lab Results   Component Value Date    WBC 7.7 2021    HGB 12.9 2021    HCT 39.8 2021    MCV 92 2021     2021    Lab Results   Component Value Date    CKTOTAL 267 (H) 03/10/2015    TSH 1.17 2020    INR 0.90 2017          Joe Roca MD [facility]  Non-Invasive Cardiologist  [facility]  Pager [phone]

## 2021-07-04 NOTE — LETTER
Letter by Joe Roca MD at      Author: Joe Roca MD Service: -- Author Type: --    Filed:  Encounter Date: 6/4/2021 Status: (Other)         Dayana Valverde  [address] [place] [address]      June 4, 2021      Dear Dayana,    This letter is to remind you that you are due for your follow up appointment with Dr. Joe Roca. To help ensure you are in the best health possible, a regular follow-up with your cardiologist is essential.     Please call our Patient Scheduling Line at [phone] or visit [email or web address] to schedule your appointment at your earliest convenience.  If you have recently scheduled an appointment, please disregard this letter.    We look forward to seeing you again. As always, we are available at the number  above for any questions or concerns you may have.      Sincerely,     The Physicians and Staff of [facility]

## 2021-07-06 VITALS
HEART RATE: 72 BPM | BODY MASS INDEX: 34.17 KG/M2 | RESPIRATION RATE: 16 BRPM | SYSTOLIC BLOOD PRESSURE: 126 MMHG | WEIGHT: 231.4 LBS | DIASTOLIC BLOOD PRESSURE: 70 MMHG

## 2021-08-17 ENCOUNTER — TELEPHONE (OUTPATIENT)
Dept: FAMILY MEDICINE | Facility: CLINIC | Age: 61
End: 2021-08-17

## 2021-08-24 NOTE — TELEPHONE ENCOUNTER
Central Prior Authorization Team   Phone: [phone]      Prior Authorization Approval    Authorization Effective Date: 8/17/2021  Authorization Expiration Date: 8/17/2022  Medication: levothyroxine (SYNTHROID/LEVOTHROID) 112 MCG tablet - APPROVED  Approved Dose/Quantity: 30 FOR 30  Reference #:     Insurance Company: [employer] - Phone [phone] Fax [phone]  Expected CoPay:       CoPay Card Available:      Foundation Assistance Needed:    Which Pharmacy is filling the prescription (Not needed for infusion/clinic administered): [facility] - [place], [place] - [address]  Pharmacy Notified: Yes  Patient Notified: Yes (**Instructed pharmacy to notify patient when script is ready to /ship.**)

## 2021-11-03 DIAGNOSIS — E78.5 HYPERLIPIDEMIA, UNSPECIFIED HYPERLIPIDEMIA TYPE: Primary | ICD-10-CM

## 2021-11-03 RX ORDER — ROSUVASTATIN CALCIUM 40 MG/1
TABLET, COATED ORAL
COMMUNITY
Start: 2021-10-11 | End: 2021-11-03

## 2021-11-03 RX ORDER — ROSUVASTATIN CALCIUM 40 MG/1
40 TABLET, COATED ORAL AT BEDTIME
Qty: 90 TABLET | Refills: 1 | Status: SHIPPED | OUTPATIENT
Start: 2021-11-03 | End: 2022-04-26

## 2021-11-26 ENCOUNTER — LAB REQUISITION (OUTPATIENT)
Dept: LAB | Facility: CLINIC | Age: 61
End: 2021-11-26
Payer: MEDICARE

## 2021-11-26 DIAGNOSIS — E78.5 HYPERLIPIDEMIA, UNSPECIFIED: ICD-10-CM

## 2021-11-26 DIAGNOSIS — E03.9 HYPOTHYROIDISM, UNSPECIFIED: ICD-10-CM

## 2021-11-26 DIAGNOSIS — E55.9 VITAMIN D DEFICIENCY, UNSPECIFIED: ICD-10-CM

## 2021-11-26 LAB
ALBUMIN SERPL-MCNC: 4.2 G/DL (ref 3.5–5)
ALP SERPL-CCNC: 110 U/L (ref 45–120)
ALT SERPL W P-5'-P-CCNC: 27 U/L (ref 0–45)
ANION GAP SERPL CALCULATED.3IONS-SCNC: 15 MMOL/L (ref 5–18)
AST SERPL W P-5'-P-CCNC: 22 U/L (ref 0–40)
BILIRUB SERPL-MCNC: 0.9 MG/DL (ref 0–1)
BUN SERPL-MCNC: 10 MG/DL (ref 8–22)
CALCIUM SERPL-MCNC: 9.6 MG/DL (ref 8.5–10.5)
CHLORIDE BLD-SCNC: 101 MMOL/L (ref 98–107)
CHOLEST SERPL-MCNC: 142 MG/DL
CO2 SERPL-SCNC: 26 MMOL/L (ref 22–31)
CREAT SERPL-MCNC: 0.86 MG/DL (ref 0.6–1.1)
FASTING STATUS PATIENT QL REPORTED: NORMAL
GFR SERPL CREATININE-BSD FRML MDRD: 72 ML/MIN/1.73M2
GLUCOSE BLD-MCNC: 99 MG/DL (ref 70–125)
HDLC SERPL-MCNC: 55 MG/DL
LDLC SERPL CALC-MCNC: 68 MG/DL
POTASSIUM BLD-SCNC: 4.4 MMOL/L (ref 3.5–5)
PROT SERPL-MCNC: 7.4 G/DL (ref 6–8)
SODIUM SERPL-SCNC: 142 MMOL/L (ref 136–145)
T4 FREE SERPL-MCNC: 1.29 NG/DL (ref 0.7–1.8)
TRIGL SERPL-MCNC: 94 MG/DL
TSH SERPL DL<=0.005 MIU/L-ACNC: 0.43 UIU/ML (ref 0.3–5)

## 2021-11-26 PROCEDURE — 84439 ASSAY OF FREE THYROXINE: CPT | Mod: ORL | Performed by: FAMILY MEDICINE

## 2021-11-26 PROCEDURE — 80061 LIPID PANEL: CPT | Mod: ORL | Performed by: FAMILY MEDICINE

## 2021-11-26 PROCEDURE — 82306 VITAMIN D 25 HYDROXY: CPT | Mod: ORL | Performed by: FAMILY MEDICINE

## 2021-11-26 PROCEDURE — 84443 ASSAY THYROID STIM HORMONE: CPT | Mod: ORL | Performed by: FAMILY MEDICINE

## 2021-11-26 PROCEDURE — 80053 COMPREHEN METABOLIC PANEL: CPT | Mod: ORL | Performed by: FAMILY MEDICINE

## 2021-11-29 LAB — DEPRECATED CALCIDIOL+CALCIFEROL SERPL-MC: 66 UG/L (ref 30–80)

## 2021-12-09 ENCOUNTER — ANCILLARY PROCEDURE (OUTPATIENT)
Dept: RADIOLOGY | Facility: CLINIC | Age: 61
End: 2021-12-09
Payer: COMMERCIAL

## 2022-01-04 ENCOUNTER — ANCILLARY PROCEDURE (OUTPATIENT)
Dept: MAMMOGRAPHY | Facility: CLINIC | Age: 62
End: 2022-01-04
Attending: FAMILY MEDICINE
Payer: MEDICARE

## 2022-01-04 DIAGNOSIS — R92.8 ABNORMAL MAMMOGRAPHY: ICD-10-CM

## 2022-01-04 PROCEDURE — 250N000009 HC RX 250: Performed by: FAMILY MEDICINE

## 2022-01-04 PROCEDURE — 19081 BX BREAST 1ST LESION STRTCTC: CPT | Mod: 50

## 2022-01-04 PROCEDURE — 19081 BX BREAST 1ST LESION STRTCTC: CPT | Mod: RT,XS

## 2022-01-04 PROCEDURE — 88305 TISSUE EXAM BY PATHOLOGIST: CPT | Mod: TC | Performed by: FAMILY MEDICINE

## 2022-01-04 RX ORDER — LIDOCAINE HYDROCHLORIDE AND EPINEPHRINE 10; 10 MG/ML; UG/ML
10 INJECTION, SOLUTION INFILTRATION; PERINEURAL ONCE
Status: COMPLETED | OUTPATIENT
Start: 2022-01-04 | End: 2022-01-04

## 2022-01-04 RX ADMIN — LIDOCAINE HYDROCHLORIDE,EPINEPHRINE BITARTRATE 10 ML: 10; .01 INJECTION, SOLUTION INFILTRATION; PERINEURAL at 09:53

## 2022-01-04 RX ADMIN — LIDOCAINE HYDROCHLORIDE,EPINEPHRINE BITARTRATE 10 ML: 10; .01 INJECTION, SOLUTION INFILTRATION; PERINEURAL at 10:29

## 2022-01-04 RX ADMIN — LIDOCAINE HYDROCHLORIDE 10 ML: 10 INJECTION, SOLUTION EPIDURAL; INFILTRATION; INTRACAUDAL; PERINEURAL at 10:29

## 2022-01-04 RX ADMIN — LIDOCAINE HYDROCHLORIDE 10 ML: 10 INJECTION, SOLUTION EPIDURAL; INFILTRATION; INTRACAUDAL; PERINEURAL at 09:53

## 2022-01-06 ENCOUNTER — TELEPHONE (OUTPATIENT)
Dept: MAMMOGRAPHY | Facility: CLINIC | Age: 62
End: 2022-01-06

## 2022-01-06 LAB
PATH REPORT.COMMENTS IMP SPEC: NORMAL
PATH REPORT.FINAL DX SPEC: NORMAL
PATH REPORT.GROSS SPEC: NORMAL
PATH REPORT.MICROSCOPIC SPEC OTHER STN: NORMAL
PATH REPORT.RELEVANT HX SPEC: NORMAL
PHOTO IMAGE: NORMAL

## 2022-01-06 PROCEDURE — 88305 TISSUE EXAM BY PATHOLOGIST: CPT | Mod: 26 | Performed by: PATHOLOGY

## 2022-01-06 PROCEDURE — 88341 IMHCHEM/IMCYTCHM EA ADD ANTB: CPT | Mod: 26 | Performed by: PATHOLOGY

## 2022-01-06 PROCEDURE — 88342 IMHCHEM/IMCYTCHM 1ST ANTB: CPT | Mod: 26 | Performed by: PATHOLOGY

## 2022-01-06 NOTE — TELEPHONE ENCOUNTER
Breast Center Radiologist, Dr. Fuller, confirms the recent breast imaging is concordant with the final pathology results.  Per radiologist, I phoned patient and discussed the benign biopsy results and to return to her routine breast screening schedule.     Patient denies any concerns at her biopsy site. Questions were answered and my phone number given if she has further questions or concerns.  Ordering provider was informed of the results and follow up plan.  Patient verbalized understanding and agrees with the plan of care.       Georgia Diggs, RN, BSN, PHN  Breast Center Imaging Nurse Coordinator   [facility]  [address]  [place] [address]  [phone]

## 2022-01-30 ENCOUNTER — HEALTH MAINTENANCE LETTER (OUTPATIENT)
Age: 62
End: 2022-01-30

## 2022-04-26 DIAGNOSIS — E78.5 HYPERLIPIDEMIA, UNSPECIFIED HYPERLIPIDEMIA TYPE: ICD-10-CM

## 2022-04-26 RX ORDER — ROSUVASTATIN CALCIUM 40 MG/1
40 TABLET, COATED ORAL AT BEDTIME
Qty: 90 TABLET | Refills: 0 | Status: SHIPPED | OUTPATIENT
Start: 2022-04-26 | End: 2022-07-25

## 2022-07-05 ENCOUNTER — TELEPHONE (OUTPATIENT)
Dept: CARDIOLOGY | Facility: CLINIC | Age: 62
End: 2022-07-05

## 2022-07-05 DIAGNOSIS — I25.119 CORONARY ARTERY DISEASE INVOLVING NATIVE HEART WITH ANGINA PECTORIS, UNSPECIFIED VESSEL OR LESION TYPE (H): ICD-10-CM

## 2022-07-05 NOTE — TELEPHONE ENCOUNTER
"Return call to patient who reported that sx have been occurring \"for several months\" and did not know if sx were related to COPD or her heart - patient looked up side effects of Imdur and decided to stop medication 7-1-22 - SOB has improved.  Patient stated she continues to experience occasional \"cramping\" in chest which is similar to what she felt prior to starting Imdur - confirmed patient sched for first available follow-up with Dr. Roca on 9-2-22.    Informed patient that she could be seen sooner by  in [facility] next wk which patient agreed to - call transf to sched.  mg      "

## 2022-07-05 NOTE — TELEPHONE ENCOUNTER
Health Call Center    Phone Message    May a detailed message be left on voicemail: yes     Reason for Call: Medication Question or concern regarding medication   Prescription Clarification  Name of Medication: isosorbide mononitrate (IMDUR) 30 MG 24 hr tablet    Prescribing Provider: Dr Roca     Pharmacy: [facility] - [place], [place] - [address]    Patient was having increase in SOB, leg pain and feeling depressed. Patient stopped taking this medication to see if she would improve and she has. She no longer wants to take this medication and would like advisement.    I scheduled a follow up with the provider in September.    Please call patient to advise.      Action Taken: Other: Cardiology    Travel Screening: Not Applicable

## 2022-07-12 ENCOUNTER — OFFICE VISIT (OUTPATIENT)
Dept: CARDIOLOGY | Facility: CLINIC | Age: 62
End: 2022-07-12
Payer: MEDICARE

## 2022-07-12 VITALS
WEIGHT: 240 LBS | HEART RATE: 77 BPM | RESPIRATION RATE: 18 BRPM | DIASTOLIC BLOOD PRESSURE: 62 MMHG | OXYGEN SATURATION: 92 % | SYSTOLIC BLOOD PRESSURE: 110 MMHG | BODY MASS INDEX: 35.44 KG/M2

## 2022-07-12 DIAGNOSIS — J44.9 CHRONIC OBSTRUCTIVE PULMONARY DISEASE, UNSPECIFIED COPD TYPE (H): ICD-10-CM

## 2022-07-12 DIAGNOSIS — E78.5 HYPERLIPIDEMIA, UNSPECIFIED HYPERLIPIDEMIA TYPE: ICD-10-CM

## 2022-07-12 DIAGNOSIS — I10 ESSENTIAL HYPERTENSION: ICD-10-CM

## 2022-07-12 DIAGNOSIS — I25.10 NONOBSTRUCTIVE ATHEROSCLEROSIS OF CORONARY ARTERY: Primary | ICD-10-CM

## 2022-07-12 DIAGNOSIS — Z87.891 FORMER SMOKER: ICD-10-CM

## 2022-07-12 PROCEDURE — 99214 OFFICE O/P EST MOD 30 MIN: CPT | Performed by: GENERAL ACUTE CARE HOSPITAL

## 2022-07-12 RX ORDER — FLUOXETINE 40 MG/1
40 CAPSULE ORAL DAILY
COMMUNITY

## 2022-07-12 RX ORDER — ASPIRIN 81 MG/1
81 TABLET, CHEWABLE ORAL DAILY
COMMUNITY
Start: 2021-01-06

## 2022-07-12 RX ORDER — ALBUTEROL SULFATE 5 MG/ML
2.5 SOLUTION RESPIRATORY (INHALATION) PRN
COMMUNITY

## 2022-07-12 RX ORDER — TIOTROPIUM BROMIDE INHALATION SPRAY 3.12 UG/1
2 SPRAY, METERED RESPIRATORY (INHALATION) DAILY
COMMUNITY

## 2022-07-12 RX ORDER — BIOTIN 1 MG
1 TABLET ORAL DAILY
COMMUNITY

## 2022-07-12 RX ORDER — HYDROCHLOROTHIAZIDE 25 MG/1
25 TABLET ORAL DAILY
Qty: 90 TABLET | Refills: 3 | Status: SHIPPED | OUTPATIENT
Start: 2022-07-12 | End: 2023-08-02

## 2022-07-12 RX ORDER — NITROGLYCERIN 0.4 MG/1
TABLET SUBLINGUAL PRN
COMMUNITY
Start: 2020-12-11

## 2022-07-12 NOTE — LETTER
7/12/2022    Barbie Silver MD  [facility] [address] [place] [address]    RE: Dayana Valverde       Dear Colleague,     I had the pleasure of seeing Dayana Valverde in the [facility].  HEART CARE ENCOUNTER NOTE        Assessment/Recommendations   Assessment:    1. Nonobstructive coronary artery disease seen on coronary angiography 1/6/2021. I am not convinced her chest cramping is anginal and she seems to feel worse, not better, with anti-anginal therapy.  2. Dyspnea on exertion. Likely multifactorial. She reports improvement with hydrochlorothiazide although this is actually a fairly weak diuretic and she has no other evidence of congestive heart failure.  3. Benign essential hypertension.  4. Hyperlipidemia. Last LDL 68 mg/dL.  5. Chronic obstructive pulmonary disease.  6. Former smoker.  7. BMI 35.44.    Plan:  1. Discontinue isosorbide mononitrate.  2. Suggested trying to take hydrochlorothiazide 25 mg every day.  3. Continue rosuvastatin 40 mg daily.  4. Aspirin 81 mg daily.  5. Follow-up with me in 2 moths.         History of Present Illness   Ms. Dayana Valverde is a 62 year old female with a significant past history of COPD and former smoker presenting for follow-up. She initially saw me for exertional dyspnea and chest discomfort, with a nuclear stress test suggesting ischemia. She was started on rosuvastatin and metoprolol and went for coronary angiography on 1/6/2021. A 60% mid RCA stenosis was found, but negative by FFR at 0.87. As she noted her symptoms seemed worse on metoprolol, this was switched to isosorbide mononitrate due to concern for coronary vasospasm. TTE was normal.    She recently started feeling more short of breath and edematous. She attributed this to isosorbide so she stopped the medication. She also took a few doses of hydrochlorothiazide. She says she lost 4 pounds and feels better now although her non-exertional chest cramping has  returned. No light headedness/dizziness, pre-syncope, syncope, palpitations, paroxysmal nocturnal dyspnea (PND), or orthopnea.     Cardiac Problems and Cardiac Diagnostics     Most Recent Cardiac testing:  ECG dated 1/6/2021 (personaly reviewed and interpreted): sinus bradycardia, otherwise normal ECG    ECHO 1/6/2021 (report reviewed):  1. Normal left ventricular size and systolic performance with a visually estimated ejection fraction of 60%.   2. No significant valvular heart disease is identified on this study.   3. Normal right ventricular size and systolic performance.     Stress test 12/10/2020 (report reviewed):      The nuclear stress test is abnormal. Stress to rest cavity ratio is 1.90 which is a nonspecific finding but may be seen in patients with multivessel disease..     Nuclear images suggest a small to medium size area of ischemia in the mid to distal anteroseptal and anterior wall although specificity reduced given breast attenuation.     The left ventricular ejection fraction at stress is greater than 70% with mild hypokinesis of the distal anteroseptal wall..     No prior study for comparison.    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 feeding lateral wall  RCA mid 60% narrowing with FFR 0.87 with wire in mid PDA      LVEDP 15mmHg     Medications  Allergies   Current Outpatient Medications   Medication Sig Dispense Refill     albuterol (PROVENTIL) (5 MG/ML) 0.5% neb solution Inhale 2.5 mg into the lungs as needed       aspirin (ASA) 81 MG chewable tablet Take 81 mg by mouth daily       biotin 1000 MCG TABS tablet Take 1 capsule by mouth daily       budesonide-formoterol (SYMBICORT) 160-4.5 MCG/ACT Inhaler Inhale 2 puffs into the lungs 2 times daily       Cholecalciferol (VITAMIN D-3) 125 MCG (5000 UT) TABS Take 5,000 mg by mouth daily       FLUoxetine (PROZAC) 40 MG capsule Take 40 mg by mouth daily        hydrochlorothiazide (HYDRODIURIL) 25 MG tablet Take 25 mg by mouth as needed       levothyroxine (SYNTHROID/LEVOTHROID) 112 MCG tablet Take 112 mcg by mouth daily       meloxicam (MOBIC) 15 MG tablet Take 15 mg by mouth daily       nitroGLYcerin (NITROSTAT) 0.4 MG sublingual tablet Place under the tongue as needed       omeprazole (PRILOSEC) 40 MG DR capsule Take 40 mg by mouth daily       POTASSIUM CHLORIDE ER PO        rosuvastatin (CRESTOR) 40 MG tablet Take 1 tablet (40 mg) by mouth At Bedtime 90 tablet 0     tiotropium (SPIRIVA RESPIMAT) 2.5 MCG/ACT inhaler Inhale 2 puffs into the lungs 2 times daily       acetaminophen (TYLENOL) 325 MG tablet Take 2 tablets (650 mg) by mouth every 4 hours as needed for mild pain (Patient not taking: Reported on 7/12/2022) 50 tablet 0     FLUoxetine (PROZAC) 20 MG capsule Take 40 mg by mouth daily (Patient not taking: Reported on 7/12/2022)       fluticasone-salmeterol (ADVAIR) 250-50 MCG/DOSE inhaler Inhale 1 puff into the lungs every 12 hours (Patient not taking: Reported on 7/12/2022)       oxyCODONE (ROXICODONE) 5 MG tablet Take 1-2 tablets (5-10 mg) by mouth every 4 hours as needed for moderate to severe pain (Patient not taking: Reported on 7/12/2022) 16 tablet 0     senna-docusate (SENOKOT-S/PERICOLACE) 8.6-50 MG tablet Take 1-2 tablets by mouth 2 times daily (Patient not taking: Reported on 7/12/2022) 15 tablet 0      Allergies   Allergen Reactions     Sodium Pentobarbital [Pentobarbital] Other (See Comments)     Sodium Pentothol  Patient does not wake up from this medication     Naproxen      Other: GI Upset          Physical Examination Review of Systems   /62 (BP Location: Left arm, Patient Position: Sitting, Cuff Size: Adult Regular)   Pulse 77   Resp 18   Wt 108.9 kg (240 lb)   LMP 11/30/1997   SpO2 92%   BMI 35.44 kg/m    Body mass index is 35.44 kg/m .  Wt Readings from Last 3 Encounters:   07/12/22 108.9 kg (240 lb)   06/07/21 105 kg (231 lb 6.4  oz)   02/11/21 104.3 kg (230 lb)       General Appearance:   Pleasant  female, appears  stated age. no acute distress, obese body habitus   ENT/Mouth: Facemask.   EYES:  no scleral icterus, normal conjunctivae   Neck: no carotid bruits. No anterior cervical lymphadenopaty   Respiratory:   lungs are clear to auscultation, no rales or wheezing, equal chest wall expansion    Cardiovascular:   Regular rhythm, noral rate. Normal first and second heart sounds with no murmurs, rubs, or gallops; the carotid, radial and posterior tibial pulses are intact, Jugular venous pressure normal, no edema bilaterally    Abdomen/GI:  no organomegaly, masses, bruits, or tenderness; bowel sounds are present   Extremities: no cyanosis or clubbing   Skin: no xanthelasma, warm.    Heme/lymph/ Immunology No apparent bleeding noted.   Neurologic: Alert and oriented. normal gait, no tremors     Psychiatric: Pleasant, calm, appropriate affect.    A complete 10 system review of systems was performed and is negative except as mentioned in the HPI/subjective.         Past History   Past Medical History:   Past Medical History:   Diagnosis Date     Cancer (H)     Melanoma     Complication of anesthesia      COPD (chronic obstructive pulmonary disease) (H)     per chart     Dyspnea on exertion      GERD (gastroesophageal reflux disease)      Hyperlipidemia      Hypothyroidism      PONV (postoperative nausea and vomiting)        Past Surgical History:   Past Surgical History:   Procedure Laterality Date     BIOPSY NODE SENTINEL Right 12/31/2019    Procedure: [place] lymph node biopsy;  Surgeon: Tramaine Garland MD;  Location: [facility] OR     CV CORONARY ANGIOGRAM N/A 1/6/2021    Procedure: Coronary Angiogram;  Surgeon: Nimesh Hawley MD;  Location: [facility] Cardiac Cath Lab;  Service: Cardiology     CV LEFT HEART CATHETERIZATION WITHOUT LEFT VENTRICULOGRAM Left 1/6/2021    Procedure: Left Heart Catheterization Without Left Ventriculogram;   Surgeon: Nimesh Hawley MD;  Location: [facility] Cardiac Cath Lab;  Service: Cardiology     EXCISE LESION BACK Right 2019    Procedure: Wide local excision of right back melanoma;  Surgeon: Tramaine Garland MD;  Location: [facility] OR     HYSTERECTOMY       TOTAL HIP ARTHROPLASTY Right 2017       Family History:   Family History   Problem Relation Age of Onset     Rheumatoid Arthritis Other      Osteoarthritis Mother      Hypertension Mother      Goiter Mother      Arthritis Other      Cancer Other      Leukemia Other      Cerebrovascular Disease Father      Heart Disease Father      Diabetes Other        Social History:   Social History     Socioeconomic History     Marital status:      Spouse name: Not on file     Number of children: Not on file     Years of education: Not on file     Highest education level: Not on file   Occupational History     Not on file   Tobacco Use     Smoking status: Former Smoker     Quit date: 2008     Years since quittin.5     Smokeless tobacco: Never Used   Substance and Sexual Activity     Alcohol use: No     Drug use: Not on file     Sexual activity: Not on file   Other Topics Concern     Not on file   Social History Narrative     Not on file     Social Determinants of Health     Financial Resource Strain: Not on file   Food Insecurity: Not on file   Transportation Needs: Not on file   Physical Activity: Not on file   Stress: Not on file   Social Connections: Not on file   Intimate Partner Violence: Not on file   Housing Stability: Not on file              Lab Results    Chemistry/lipid CBC Cardiac Enzymes/BNP/TSH/INR   Lab Results   Component Value Date    CHOL 142 2021    HDL 55 2021    LDL 68 2021    TRIG 94 2021    CR 0.86 2021    BUN 10 2021    POTASSIUM 4.4 2021     2021    CO2 26 2021      Lab Results   Component Value Date    WBC 7.7 2021    HGB 12.9 2021    HCT 39.8 2021     MCV 92 01/06/2021     01/06/2021    Lab Results   Component Value Date    TSH 0.43 11/26/2021          Joe Roca MD [facility]  Non-Invasive Cardiologist  [facility]  Pager [phone]    Thank you for allowing me to participate in the care of your patient.      Sincerely,     Joe Roca MD     [facility]  cc:   Joe Roca MD  [address] [place], [place] [address]  [place] [address]

## 2022-07-12 NOTE — PROGRESS NOTES
HEART CARE ENCOUNTER NOTE        Assessment/Recommendations   Assessment:    1. Nonobstructive coronary artery disease seen on coronary angiography 1/6/2021. I am not convinced her chest cramping is anginal and she seems to feel worse, not better, with anti-anginal therapy.  2. Dyspnea on exertion. Likely multifactorial. She reports improvement with hydrochlorothiazide although this is actually a fairly weak diuretic and she has no other evidence of congestive heart failure.  3. Benign essential hypertension.  4. Hyperlipidemia. Last LDL 68 mg/dL.  5. Chronic obstructive pulmonary disease.  6. Former smoker.  7. BMI 35.44.    Plan:  1. Discontinue isosorbide mononitrate.  2. Suggested trying to take hydrochlorothiazide 25 mg every day.  3. Continue rosuvastatin 40 mg daily.  4. Aspirin 81 mg daily.  5. Follow-up with me in 2 moths.         History of Present Illness   Ms. Dayana Valverde is a 62 year old female with a significant past history of COPD and former smoker presenting for follow-up. She initially saw me for exertional dyspnea and chest discomfort, with a nuclear stress test suggesting ischemia. She was started on rosuvastatin and metoprolol and went for coronary angiography on 1/6/2021. A 60% mid RCA stenosis was found, but negative by FFR at 0.87. As she noted her symptoms seemed worse on metoprolol, this was switched to isosorbide mononitrate due to concern for coronary vasospasm. TTE was normal.    She recently started feeling more short of breath and edematous. She attributed this to isosorbide so she stopped the medication. She also took a few doses of hydrochlorothiazide. She says she lost 4 pounds and feels better now although her non-exertional chest cramping has returned. No light headedness/dizziness, pre-syncope, syncope, palpitations, paroxysmal nocturnal dyspnea (PND), or orthopnea.     Cardiac Problems and Cardiac Diagnostics     Most Recent Cardiac testing:  ECG dated 1/6/2021 (personaly  reviewed and interpreted): sinus bradycardia, otherwise normal ECG    ECHO 1/6/2021 (report reviewed):  1. Normal left ventricular size and systolic performance with a visually estimated ejection fraction of 60%.   2. No significant valvular heart disease is identified on this study.   3. Normal right ventricular size and systolic performance.     Stress test 12/10/2020 (report reviewed):      The nuclear stress test is abnormal. Stress to rest cavity ratio is 1.90 which is a nonspecific finding but may be seen in patients with multivessel disease..     Nuclear images suggest a small to medium size area of ischemia in the mid to distal anteroseptal and anterior wall although specificity reduced given breast attenuation.     The left ventricular ejection fraction at stress is greater than 70% with mild hypokinesis of the distal anteroseptal wall..     No prior study for comparison.    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 feeding lateral wall  RCA mid 60% narrowing with FFR 0.87 with wire in mid PDA      LVEDP 15mmHg     Medications  Allergies   Current Outpatient Medications   Medication Sig Dispense Refill     albuterol (PROVENTIL) (5 MG/ML) 0.5% neb solution Inhale 2.5 mg into the lungs as needed       aspirin (ASA) 81 MG chewable tablet Take 81 mg by mouth daily       biotin 1000 MCG TABS tablet Take 1 capsule by mouth daily       budesonide-formoterol (SYMBICORT) 160-4.5 MCG/ACT Inhaler Inhale 2 puffs into the lungs 2 times daily       Cholecalciferol (VITAMIN D-3) 125 MCG (5000 UT) TABS Take 5,000 mg by mouth daily       FLUoxetine (PROZAC) 40 MG capsule Take 40 mg by mouth daily       hydrochlorothiazide (HYDRODIURIL) 25 MG tablet Take 25 mg by mouth as needed       levothyroxine (SYNTHROID/LEVOTHROID) 112 MCG tablet Take 112 mcg by mouth daily       meloxicam (MOBIC) 15 MG tablet Take 15 mg by mouth daily        nitroGLYcerin (NITROSTAT) 0.4 MG sublingual tablet Place under the tongue as needed       omeprazole (PRILOSEC) 40 MG DR capsule Take 40 mg by mouth daily       POTASSIUM CHLORIDE ER PO        rosuvastatin (CRESTOR) 40 MG tablet Take 1 tablet (40 mg) by mouth At Bedtime 90 tablet 0     tiotropium (SPIRIVA RESPIMAT) 2.5 MCG/ACT inhaler Inhale 2 puffs into the lungs 2 times daily       acetaminophen (TYLENOL) 325 MG tablet Take 2 tablets (650 mg) by mouth every 4 hours as needed for mild pain (Patient not taking: Reported on 7/12/2022) 50 tablet 0     FLUoxetine (PROZAC) 20 MG capsule Take 40 mg by mouth daily (Patient not taking: Reported on 7/12/2022)       fluticasone-salmeterol (ADVAIR) 250-50 MCG/DOSE inhaler Inhale 1 puff into the lungs every 12 hours (Patient not taking: Reported on 7/12/2022)       oxyCODONE (ROXICODONE) 5 MG tablet Take 1-2 tablets (5-10 mg) by mouth every 4 hours as needed for moderate to severe pain (Patient not taking: Reported on 7/12/2022) 16 tablet 0     senna-docusate (SENOKOT-S/PERICOLACE) 8.6-50 MG tablet Take 1-2 tablets by mouth 2 times daily (Patient not taking: Reported on 7/12/2022) 15 tablet 0      Allergies   Allergen Reactions     Sodium Pentobarbital [Pentobarbital] Other (See Comments)     Sodium Pentothol  Patient does not wake up from this medication     Naproxen      Other: GI Upset          Physical Examination Review of Systems   /62 (BP Location: Left arm, Patient Position: Sitting, Cuff Size: Adult Regular)   Pulse 77   Resp 18   Wt 108.9 kg (240 lb)   LMP 11/30/1997   SpO2 92%   BMI 35.44 kg/m    Body mass index is 35.44 kg/m .  Wt Readings from Last 3 Encounters:   07/12/22 108.9 kg (240 lb)   06/07/21 105 kg (231 lb 6.4 oz)   02/11/21 104.3 kg (230 lb)       General Appearance:   Pleasant  female, appears  stated age. no acute distress, obese body habitus   ENT/Mouth: Facemask.   EYES:  no scleral icterus, normal conjunctivae   Neck: no carotid bruits.  No anterior cervical lymphadenopaty   Respiratory:   lungs are clear to auscultation, no rales or wheezing, equal chest wall expansion    Cardiovascular:   Regular rhythm, noral rate. Normal first and second heart sounds with no murmurs, rubs, or gallops; the carotid, radial and posterior tibial pulses are intact, Jugular venous pressure normal, no edema bilaterally    Abdomen/GI:  no organomegaly, masses, bruits, or tenderness; bowel sounds are present   Extremities: no cyanosis or clubbing   Skin: no xanthelasma, warm.    Heme/lymph/ Immunology No apparent bleeding noted.   Neurologic: Alert and oriented. normal gait, no tremors     Psychiatric: Pleasant, calm, appropriate affect.    A complete 10 system review of systems was performed and is negative except as mentioned in the HPI/subjective.         Past History   Past Medical History:   Past Medical History:   Diagnosis Date     Cancer (H)     Melanoma     Complication of anesthesia      COPD (chronic obstructive pulmonary disease) (H)     per chart     Dyspnea on exertion      GERD (gastroesophageal reflux disease)      Hyperlipidemia      Hypothyroidism      PONV (postoperative nausea and vomiting)        Past Surgical History:   Past Surgical History:   Procedure Laterality Date     BIOPSY NODE SENTINEL Right 12/31/2019    Procedure: [place] lymph node biopsy;  Surgeon: Tramaine Garland MD;  Location:  OR      CORONARY ANGIOGRAM N/A 1/6/2021    Procedure: Coronary Angiogram;  Surgeon: Nimesh Hawley MD;  Location: [facility] Cardiac Cath Lab;  Service: Cardiology     CV LEFT HEART CATHETERIZATION WITHOUT LEFT VENTRICULOGRAM Left 1/6/2021    Procedure: Left Heart Catheterization Without Left Ventriculogram;  Surgeon: Nimesh Hawley MD;  Location: [facility] Cardiac Cath Lab;  Service: Cardiology     EXCISE LESION BACK Right 12/31/2019    Procedure: Wide local excision of right back melanoma;  Surgeon: Tramaine Garland MD;  Location:  OR      HYSTERECTOMY       TOTAL HIP ARTHROPLASTY Right 2017       Family History:   Family History   Problem Relation Age of Onset     Rheumatoid Arthritis Other      Osteoarthritis Mother      Hypertension Mother      Goiter Mother      Arthritis Other      Cancer Other      Leukemia Other      Cerebrovascular Disease Father      Heart Disease Father      Diabetes Other        Social History:   Social History     Socioeconomic History     Marital status:      Spouse name: Not on file     Number of children: Not on file     Years of education: Not on file     Highest education level: Not on file   Occupational History     Not on file   Tobacco Use     Smoking status: Former Smoker     Quit date: 2008     Years since quittin.5     Smokeless tobacco: Never Used   Substance and Sexual Activity     Alcohol use: No     Drug use: Not on file     Sexual activity: Not on file   Other Topics Concern     Not on file   Social History Narrative     Not on file     Social Determinants of Health     Financial Resource Strain: Not on file   Food Insecurity: Not on file   Transportation Needs: Not on file   Physical Activity: Not on file   Stress: Not on file   Social Connections: Not on file   Intimate Partner Violence: Not on file   Housing Stability: Not on file              Lab Results    Chemistry/lipid CBC Cardiac Enzymes/BNP/TSH/INR   Lab Results   Component Value Date    CHOL 142 2021    HDL 55 2021    LDL 68 2021    TRIG 94 2021    CR 0.86 2021    BUN 10 2021    POTASSIUM 4.4 2021     2021    CO2 26 2021      Lab Results   Component Value Date    WBC 7.7 2021    HGB 12.9 2021    HCT 39.8 2021    MCV 92 2021     2021    Lab Results   Component Value Date    TSH 0.43 2021          Joe Roca MD [facility]  Non-Invasive Cardiologist  [facility]  Pager [phone]

## 2022-07-25 DIAGNOSIS — E78.5 HYPERLIPIDEMIA, UNSPECIFIED HYPERLIPIDEMIA TYPE: ICD-10-CM

## 2022-07-25 RX ORDER — ROSUVASTATIN CALCIUM 40 MG/1
40 TABLET, COATED ORAL AT BEDTIME
Qty: 90 TABLET | Refills: 1 | Status: SHIPPED | OUTPATIENT
Start: 2022-07-25 | End: 2022-12-12

## 2022-09-17 ENCOUNTER — LAB REQUISITION (OUTPATIENT)
Dept: LAB | Facility: CLINIC | Age: 62
End: 2022-09-17
Payer: MEDICARE

## 2022-09-17 ENCOUNTER — TRANSFERRED RECORDS (OUTPATIENT)
Dept: HEALTH INFORMATION MANAGEMENT | Facility: CLINIC | Age: 62
End: 2022-09-17

## 2022-09-17 DIAGNOSIS — R35.0 FREQUENCY OF MICTURITION: ICD-10-CM

## 2022-09-17 PROCEDURE — 87086 URINE CULTURE/COLONY COUNT: CPT | Mod: ORL | Performed by: PHYSICIAN ASSISTANT

## 2022-09-18 ENCOUNTER — HEALTH MAINTENANCE LETTER (OUTPATIENT)
Age: 62
End: 2022-09-18

## 2022-09-19 LAB — BACTERIA UR CULT: NORMAL

## 2022-11-04 ENCOUNTER — OFFICE VISIT (OUTPATIENT)
Dept: CARDIOLOGY | Facility: CLINIC | Age: 62
End: 2022-11-04
Attending: GENERAL ACUTE CARE HOSPITAL
Payer: MEDICARE

## 2022-11-04 VITALS
SYSTOLIC BLOOD PRESSURE: 130 MMHG | DIASTOLIC BLOOD PRESSURE: 78 MMHG | HEIGHT: 69 IN | RESPIRATION RATE: 16 BRPM | BODY MASS INDEX: 32.82 KG/M2 | HEART RATE: 76 BPM | WEIGHT: 221.6 LBS

## 2022-11-04 DIAGNOSIS — R06.09 DYSPNEA ON EXERTION: ICD-10-CM

## 2022-11-04 DIAGNOSIS — E78.5 HYPERLIPIDEMIA, UNSPECIFIED HYPERLIPIDEMIA TYPE: ICD-10-CM

## 2022-11-04 DIAGNOSIS — Z87.891 FORMER SMOKER: ICD-10-CM

## 2022-11-04 DIAGNOSIS — R07.9 CHEST PAIN, UNSPECIFIED TYPE: Primary | ICD-10-CM

## 2022-11-04 DIAGNOSIS — I10 ESSENTIAL HYPERTENSION: ICD-10-CM

## 2022-11-04 DIAGNOSIS — J44.9 CHRONIC OBSTRUCTIVE PULMONARY DISEASE, UNSPECIFIED COPD TYPE (H): ICD-10-CM

## 2022-11-04 DIAGNOSIS — I25.10 NONOBSTRUCTIVE ATHEROSCLEROSIS OF CORONARY ARTERY: ICD-10-CM

## 2022-11-04 PROCEDURE — 99214 OFFICE O/P EST MOD 30 MIN: CPT | Performed by: GENERAL ACUTE CARE HOSPITAL

## 2022-11-04 NOTE — PROGRESS NOTES
HEART CARE ENCOUNTER NOTE      Assessment/Recommendations   Assessment:    1. Nonobstructive coronary artery disease seen on coronary angiography 1/6/2021. I am not convinced her chest cramping is anginal and she seems to feel worse, not better, with anti-anginal therapy. No new symptoms.  2. Dyspnea on exertion. Likely multifactorial but I suspect mostly related to lung disease.  3. Benign essential hypertension. Controlled.  4. Hyperlipidemia. Last LDL 68 mg/dL.  5. Chronic obstructive pulmonary disease.  6. Former smoker.  7. BMI 33.20.    Plan:  1. Continue rosuvastatin and hydrochlorothiazide.  2. She did not tolerate isosorbide mononitrate.  3. Aspirin 81 mg daily.  4. Follow-up with me in 1 year.         History of Present Illness   Ms. Dayana Valverde is a 62 year old female with a significant past history of COPD and former smoker presenting for follow-up. She initially saw me for exertional dyspnea and chest discomfort, with a nuclear stress test suggesting ischemia. She was started on rosuvastatin and metoprolol and went for coronary angiography on 1/6/2021. A 60% mid RCA stenosis was found, but negative by FFR at 0.87. As she noted her symptoms seemed worse on metoprolol, this was switched to isosorbide mononitrate due to concern for coronary vasospasm. TTE was normal.    She felt more short of breath and edematous after starting isosorbide mononitrate so she stopped the medication and felt better. She feels hydrochlorothiazide also helps with symptoms.    Stable dyspnea on exertion and occasional non-exertional chest discomfort. No light headedness/dizziness, pre-syncope, syncope, lower extremity swelling, palpitations, paroxysmal nocturnal dyspnea (PND), or orthopnea.     Cardiac Problems and Cardiac Diagnostics     Most Recent Cardiac testing:  ECG dated 1/6/2021 (personaly reviewed and interpreted): sinus bradycardia, otherwise normal ECG     ECHO 1/6/2021 (report reviewed):  1. Normal left  ventricular size and systolic performance with a visually estimated ejection fraction of 60%.   2. No significant valvular heart disease is identified on this study.   3. Normal right ventricular size and systolic performance.      Stress test 12/10/2020 (report reviewed):      The nuclear stress test is abnormal. Stress to rest cavity ratio is 1.90 which is a nonspecific finding but may be seen in patients with multivessel disease..     Nuclear images suggest a small to medium size area of ischemia in the mid to distal anteroseptal and anterior wall although specificity reduced given breast attenuation.     The left ventricular ejection fraction at stress is greater than 70% with mild hypokinesis of the distal anteroseptal wall..     No prior study for comparison.     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  Centrilobular emphysema. No acute infiltrate, pneumothorax, pulmonary nodule or mediastinal lymphadenopathy. No pleural or pericardial effusion.     Medications  Allergies   Current Outpatient Medications   Medication Sig Dispense Refill     albuterol (PROVENTIL) (5 MG/ML) 0.5% neb solution Inhale 2.5 mg into the lungs as needed       aspirin (ASA) 81 MG chewable tablet Take 81 mg by mouth daily       biotin 1000 MCG TABS tablet Take 1 capsule by mouth daily       budesonide-formoterol (SYMBICORT) 160-4.5 MCG/ACT Inhaler Inhale 2 puffs into the lungs 2 times daily       Cholecalciferol (VITAMIN D-3) 125 MCG (5000 UT) TABS Take 5,000 mg by mouth daily       FLUoxetine (PROZAC) 40 MG capsule Take 40 mg by mouth daily       hydrochlorothiazide (HYDRODIURIL) 25 MG tablet Take 1 tablet (25 mg) by mouth daily 90 tablet 3     levothyroxine (SYNTHROID/LEVOTHROID) 112  "MCG tablet Take 112 mcg by mouth daily       magnesium 100 MG CAPS Every 3 days       meloxicam (MOBIC) 15 MG tablet Take 15 mg by mouth daily       nitroGLYcerin (NITROSTAT) 0.4 MG sublingual tablet Place under the tongue as needed       omeprazole (PRILOSEC) 40 MG DR capsule Take 40 mg by mouth daily       POTASSIUM CHLORIDE ER PO        rosuvastatin (CRESTOR) 40 MG tablet Take 1 tablet (40 mg) by mouth At Bedtime 90 tablet 1     tiotropium (SPIRIVA RESPIMAT) 2.5 MCG/ACT inhaler Inhale 2 puffs into the lungs 2 times daily        Allergies   Allergen Reactions     Sodium Pentobarbital [Pentobarbital] Other (See Comments)     Sodium Pentothol  Patient does not wake up from this medication     Naproxen      Other: GI Upset          Physical Examination Review of Systems   /78 (BP Location: Right arm, Patient Position: Sitting, Cuff Size: Adult Regular)   Pulse 76   Resp 16   Ht 1.74 m (5' 8.5\")   Wt 100.5 kg (221 lb 9.6 oz)   LMP 11/30/1997   BMI 33.20 kg/m    Body mass index is 33.2 kg/m .  Wt Readings from Last 3 Encounters:   11/04/22 100.5 kg (221 lb 9.6 oz)   07/12/22 108.9 kg (240 lb)   06/07/21 105 kg (231 lb 6.4 oz)       General Appearance:   Pleasant  female, appears  stated age. no acute distress, normal body habitus   ENT/Mouth: Facemask.     EYES:  no scleral icterus, normal conjunctivae   Neck: no carotid bruits. No anterior cervical lymphadenopaty   Respiratory:   lungs are clear to auscultation, no rales or wheezing, equal chest wall expansion    Cardiovascular:   Regular rhythm, normal rate. Normal first and second heart sounds with no murmurs, rubs, or gallops; the carotid, radial and posterior tibial pulses are intact, Jugular venous pressure normal, no edema bilaterally    Abdomen/GI:  no organomegaly, masses, bruits, or tenderness; bowel sounds are present   Extremities: no cyanosis or clubbing   Skin: no xanthelasma, warm.    Heme/lymph/ Immunology No apparent bleeding noted. "   Neurologic: Alert and oriented. normal gait, no tremors     Psychiatric: Pleasant, calm, appropriate affect.    A complete 10 system review of systems was performed and is negative except as mentioned in the HPI/subjective.         Past History   Past Medical History:   Past Medical History:   Diagnosis Date     Cancer (H)     Melanoma     Complication of anesthesia      COPD (chronic obstructive pulmonary disease) (H)     per chart     Dyspnea on exertion      GERD (gastroesophageal reflux disease)      Hyperlipidemia      Hypothyroidism      PONV (postoperative nausea and vomiting)        Past Surgical History:   Past Surgical History:   Procedure Laterality Date     BIOPSY NODE SENTINEL Right 12/31/2019    Procedure: [place] lymph node biopsy;  Surgeon: Tramaine Galrand MD;  Location: [facility] OR     CV CORONARY ANGIOGRAM N/A 1/6/2021    Procedure: Coronary Angiogram;  Surgeon: Nimesh Hawley MD;  Location: [facility] Cardiac Cath Lab;  Service: Cardiology     CV LEFT HEART CATHETERIZATION WITHOUT LEFT VENTRICULOGRAM Left 1/6/2021    Procedure: Left Heart Catheterization Without Left Ventriculogram;  Surgeon: Nimesh Hawley MD;  Location: [facility] Cardiac Cath Lab;  Service: Cardiology     EXCISE LESION BACK Right 12/31/2019    Procedure: Wide local excision of right back melanoma;  Surgeon: Tramaine Garland MD;  Location: [facility] OR     HYSTERECTOMY       TOTAL HIP ARTHROPLASTY Right 2017       Family History:   Family History   Problem Relation Age of Onset     Rheumatoid Arthritis Other      Osteoarthritis Mother      Hypertension Mother      Goiter Mother      Arthritis Other      Cancer Other      Leukemia Other      Cerebrovascular Disease Father      Heart Disease Father      Diabetes Other        Social History:   Social History     Socioeconomic History     Marital status:      Spouse name: Not on file     Number of children: Not on file     Years of education: Not on file     Highest  education level: Not on file   Occupational History     Not on file   Tobacco Use     Smoking status: Former     Types: Cigarettes     Quit date: 2008     Years since quittin.8     Smokeless tobacco: Never   Substance and Sexual Activity     Alcohol use: No     Drug use: Not on file     Sexual activity: Not on file   Other Topics Concern     Not on file   Social History Narrative     Not on file     Social Determinants of Health     Financial Resource Strain: Not on file   Food Insecurity: Not on file   Transportation Needs: Not on file   Physical Activity: Not on file   Stress: Not on file   Social Connections: Not on file   Intimate Partner Violence: Not on file   Housing Stability: Not on file              Lab Results    Chemistry/lipid CBC Cardiac Enzymes/BNP/TSH/INR   Lab Results   Component Value Date    CHOL 142 2021    HDL 55 2021    LDL 68 2021    TRIG 94 2021    CR 0.86 2021    BUN 10 2021    POTASSIUM 4.4 2021     2021    CO2 26 2021      Lab Results   Component Value Date    WBC 7.7 2021    HGB 12.9 2021    HCT 39.8 2021    MCV 92 2021     2021      Lab Results   Component Value Date    TSH 0.43 2021          Joe Roca MD [facility]  Non-Invasive Cardiologist  [facility]  Pager [phone]

## 2022-11-04 NOTE — LETTER
11/4/2022    Barbie Silver MD  [address] [place] [address]    RE: Dayana Valverde       Dear Colleague,     I had the pleasure of seeing Dayana Valverde in the [facility].  HEART CARE ENCOUNTER NOTE      Assessment/Recommendations   Assessment:    1. Nonobstructive coronary artery disease seen on coronary angiography 1/6/2021. I am not convinced her chest cramping is anginal and she seems to feel worse, not better, with anti-anginal therapy. No new symptoms.  2. Dyspnea on exertion. Likely multifactorial but I suspect mostly related to lung disease.  3. Benign essential hypertension. Controlled.  4. Hyperlipidemia. Last LDL 68 mg/dL.  5. Chronic obstructive pulmonary disease.  6. Former smoker.  7. BMI 33.20.    Plan:  1. Continue rosuvastatin and hydrochlorothiazide.  2. She did not tolerate isosorbide mononitrate.  3. Aspirin 81 mg daily.  4. Follow-up with me in 1 year.         History of Present Illness   Ms. Dayana Valverde is a 62 year old female with a significant past history of COPD and former smoker presenting for follow-up. She initially saw me for exertional dyspnea and chest discomfort, with a nuclear stress test suggesting ischemia. She was started on rosuvastatin and metoprolol and went for coronary angiography on 1/6/2021. A 60% mid RCA stenosis was found, but negative by FFR at 0.87. As she noted her symptoms seemed worse on metoprolol, this was switched to isosorbide mononitrate due to concern for coronary vasospasm. TTE was normal.    She felt more short of breath and edematous after starting isosorbide mononitrate so she stopped the medication and felt better. She feels hydrochlorothiazide also helps with symptoms.    Stable dyspnea on exertion and occasional non-exertional chest discomfort. No light headedness/dizziness, pre-syncope, syncope, lower extremity swelling, palpitations, paroxysmal nocturnal dyspnea (PND), or orthopnea.     Cardiac Problems and  Cardiac Diagnostics     Most Recent Cardiac testing:  ECG dated 1/6/2021 (personaly reviewed and interpreted): sinus bradycardia, otherwise normal ECG     ECHO 1/6/2021 (report reviewed):  1. Normal left ventricular size and systolic performance with a visually estimated ejection fraction of 60%.   2. No significant valvular heart disease is identified on this study.   3. Normal right ventricular size and systolic performance.      Stress test 12/10/2020 (report reviewed):      The nuclear stress test is abnormal. Stress to rest cavity ratio is 1.90 which is a nonspecific finding but may be seen in patients with multivessel disease..     Nuclear images suggest a small to medium size area of ischemia in the mid to distal anteroseptal and anterior wall although specificity reduced given breast attenuation.     The left ventricular ejection fraction at stress is greater than 70% with mild hypokinesis of the distal anteroseptal wall..     No prior study for comparison.     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  Centrilobular emphysema. No acute infiltrate, pneumothorax, pulmonary nodule or mediastinal lymphadenopathy. No pleural or pericardial effusion.     Medications  Allergies   Current Outpatient Medications   Medication Sig Dispense Refill     albuterol (PROVENTIL) (5 MG/ML) 0.5% neb solution Inhale 2.5 mg into the lungs as needed       aspirin (ASA) 81 MG chewable tablet Take 81 mg by mouth daily       biotin 1000 MCG TABS tablet Take 1 capsule by mouth daily       budesonide-formoterol (SYMBICORT) 160-4.5 MCG/ACT Inhaler Inhale 2 puffs into the lungs 2 times daily       Cholecalciferol (VITAMIN D-3) 125 MCG (5000 UT) TABS Take 5,000 mg by mouth daily        "FLUoxetine (PROZAC) 40 MG capsule Take 40 mg by mouth daily       hydrochlorothiazide (HYDRODIURIL) 25 MG tablet Take 1 tablet (25 mg) by mouth daily 90 tablet 3     levothyroxine (SYNTHROID/LEVOTHROID) 112 MCG tablet Take 112 mcg by mouth daily       magnesium 100 MG CAPS Every 3 days       meloxicam (MOBIC) 15 MG tablet Take 15 mg by mouth daily       nitroGLYcerin (NITROSTAT) 0.4 MG sublingual tablet Place under the tongue as needed       omeprazole (PRILOSEC) 40 MG DR capsule Take 40 mg by mouth daily       POTASSIUM CHLORIDE ER PO        rosuvastatin (CRESTOR) 40 MG tablet Take 1 tablet (40 mg) by mouth At Bedtime 90 tablet 1     tiotropium (SPIRIVA RESPIMAT) 2.5 MCG/ACT inhaler Inhale 2 puffs into the lungs 2 times daily        Allergies   Allergen Reactions     Sodium Pentobarbital [Pentobarbital] Other (See Comments)     Sodium Pentothol  Patient does not wake up from this medication     Naproxen      Other: GI Upset          Physical Examination Review of Systems   /78 (BP Location: Right arm, Patient Position: Sitting, Cuff Size: Adult Regular)   Pulse 76   Resp 16   Ht 1.74 m (5' 8.5\")   Wt 100.5 kg (221 lb 9.6 oz)   LMP 11/30/1997   BMI 33.20 kg/m    Body mass index is 33.2 kg/m .  Wt Readings from Last 3 Encounters:   11/04/22 100.5 kg (221 lb 9.6 oz)   07/12/22 108.9 kg (240 lb)   06/07/21 105 kg (231 lb 6.4 oz)       General Appearance:   Pleasant  female, appears  stated age. no acute distress, normal body habitus   ENT/Mouth: Facemask.     EYES:  no scleral icterus, normal conjunctivae   Neck: no carotid bruits. No anterior cervical lymphadenopaty   Respiratory:   lungs are clear to auscultation, no rales or wheezing, equal chest wall expansion    Cardiovascular:   Regular rhythm, normal rate. Normal first and second heart sounds with no murmurs, rubs, or gallops; the carotid, radial and posterior tibial pulses are intact, Jugular venous pressure normal, no edema bilaterally  "   Abdomen/GI:  no organomegaly, masses, bruits, or tenderness; bowel sounds are present   Extremities: no cyanosis or clubbing   Skin: no xanthelasma, warm.    Heme/lymph/ Immunology No apparent bleeding noted.   Neurologic: Alert and oriented. normal gait, no tremors     Psychiatric: Pleasant, calm, appropriate affect.    A complete 10 system review of systems was performed and is negative except as mentioned in the HPI/subjective.         Past History   Past Medical History:   Past Medical History:   Diagnosis Date     Cancer (H)     Melanoma     Complication of anesthesia      COPD (chronic obstructive pulmonary disease) (H)     per chart     Dyspnea on exertion      GERD (gastroesophageal reflux disease)      Hyperlipidemia      Hypothyroidism      PONV (postoperative nausea and vomiting)        Past Surgical History:   Past Surgical History:   Procedure Laterality Date     BIOPSY NODE SENTINEL Right 12/31/2019    Procedure: [place] lymph node biopsy;  Surgeon: Tramaine Garland MD;  [facility]     CV CORONARY ANGIOGRAM N/A 1/6/2021    Procedure: Coronary Angiogram;  Surgeon: Nimesh Hawley MD;  Location: [facility] Cardiac Cath Lab;  Service: Cardiology     CV LEFT HEART CATHETERIZATION WITHOUT LEFT VENTRICULOGRAM Left 1/6/2021    Procedure: Left Heart Catheterization Without Left Ventriculogram;  Surgeon: Nimesh Hawley MD;  Location: [facility] Cardiac Cath Lab;  Service: Cardiology     EXCISE LESION BACK Right 12/31/2019    Procedure: Wide local excision of right back melanoma;  Surgeon: Tramaine Garland MD;  Location: [facility]     HYSTERECTOMY       TOTAL HIP ARTHROPLASTY Right 2017       Family History:   Family History   Problem Relation Age of Onset     Rheumatoid Arthritis Other      Osteoarthritis Mother      Hypertension Mother      Goiter Mother      Arthritis Other      Cancer Other      Leukemia Other      Cerebrovascular Disease Father      Heart Disease Father      Diabetes Other         Social History:   Social History     Socioeconomic History     Marital status:      Spouse name: Not on file     Number of children: Not on file     Years of education: Not on file     Highest education level: Not on file   Occupational History     Not on file   Tobacco Use     Smoking status: Former     Types: Cigarettes     Quit date: 2008     Years since quittin.8     Smokeless tobacco: Never   Substance and Sexual Activity     Alcohol use: No     Drug use: Not on file     Sexual activity: Not on file   Other Topics Concern     Not on file   Social History Narrative     Not on file     Social Determinants of Health     Financial Resource Strain: Not on file   Food Insecurity: Not on file   Transportation Needs: Not on file   Physical Activity: Not on file   Stress: Not on file   Social Connections: Not on file   Intimate Partner Violence: Not on file   Housing Stability: Not on file              Lab Results    Chemistry/lipid CBC Cardiac Enzymes/BNP/TSH/INR   Lab Results   Component Value Date    CHOL 142 2021    HDL 55 2021    LDL 68 2021    TRIG 94 2021    CR 0.86 2021    BUN 10 2021    POTASSIUM 4.4 2021     2021    CO2 26 2021      Lab Results   Component Value Date    WBC 7.7 2021    HGB 12.9 2021    HCT 39.8 2021    MCV 92 2021     2021      Lab Results   Component Value Date    TSH 0.43 2021          Joe Roca MD formerly [facility]  Non-Invasive Cardiologist  [facility]  Pager [phone]        Thank you for allowing me to participate in the care of your patient.      Sincerely,     Joe Roca MD     [facility]  cc:   Joe Roca MD  [address]  [place] [address]

## 2022-12-02 ENCOUNTER — LAB REQUISITION (OUTPATIENT)
Dept: LAB | Facility: CLINIC | Age: 62
End: 2022-12-02
Payer: MEDICARE

## 2022-12-02 DIAGNOSIS — R20.2 PARESTHESIA OF SKIN: ICD-10-CM

## 2022-12-02 DIAGNOSIS — E03.9 HYPOTHYROIDISM, UNSPECIFIED: ICD-10-CM

## 2022-12-02 LAB
CHOLEST SERPL-MCNC: 127 MG/DL
ERYTHROCYTE [SEDIMENTATION RATE] IN BLOOD BY WESTERGREN METHOD: 20 MM/HR (ref 0–30)
HDLC SERPL-MCNC: 48 MG/DL
LDLC SERPL CALC-MCNC: 58 MG/DL
NONHDLC SERPL-MCNC: 79 MG/DL
T4 FREE SERPL-MCNC: 1.84 NG/DL (ref 0.9–1.7)
TRIGL SERPL-MCNC: 106 MG/DL

## 2022-12-02 PROCEDURE — 82746 ASSAY OF FOLIC ACID SERUM: CPT | Mod: ORL | Performed by: FAMILY MEDICINE

## 2022-12-02 PROCEDURE — 82607 VITAMIN B-12: CPT | Mod: ORL | Performed by: FAMILY MEDICINE

## 2022-12-02 PROCEDURE — 80053 COMPREHEN METABOLIC PANEL: CPT | Mod: ORL | Performed by: FAMILY MEDICINE

## 2022-12-02 PROCEDURE — 84439 ASSAY OF FREE THYROXINE: CPT | Mod: ORL | Performed by: FAMILY MEDICINE

## 2022-12-02 PROCEDURE — 85652 RBC SED RATE AUTOMATED: CPT | Mod: ORL | Performed by: FAMILY MEDICINE

## 2022-12-02 PROCEDURE — 80061 LIPID PANEL: CPT | Mod: ORL | Performed by: FAMILY MEDICINE

## 2022-12-02 PROCEDURE — 86038 ANTINUCLEAR ANTIBODIES: CPT | Mod: ORL | Performed by: FAMILY MEDICINE

## 2022-12-02 PROCEDURE — 84443 ASSAY THYROID STIM HORMONE: CPT | Mod: ORL | Performed by: FAMILY MEDICINE

## 2022-12-03 LAB
ALBUMIN SERPL BCG-MCNC: 4.6 G/DL (ref 3.5–5.2)
ALP SERPL-CCNC: 95 U/L (ref 35–104)
ALT SERPL W P-5'-P-CCNC: 31 U/L (ref 10–35)
ANION GAP SERPL CALCULATED.3IONS-SCNC: 21 MMOL/L (ref 7–15)
AST SERPL W P-5'-P-CCNC: 29 U/L (ref 10–35)
BILIRUB SERPL-MCNC: 0.8 MG/DL
BUN SERPL-MCNC: 15.8 MG/DL (ref 8–23)
CALCIUM SERPL-MCNC: 9.1 MG/DL (ref 8.8–10.2)
CHLORIDE SERPL-SCNC: 100 MMOL/L (ref 98–107)
CREAT SERPL-MCNC: 0.93 MG/DL (ref 0.51–0.95)
DEPRECATED HCO3 PLAS-SCNC: 21 MMOL/L (ref 22–29)
GFR SERPL CREATININE-BSD FRML MDRD: 68 ML/MIN/1.73M2
GLUCOSE SERPL-MCNC: 81 MG/DL (ref 70–99)
POTASSIUM SERPL-SCNC: 4.6 MMOL/L (ref 3.4–5.3)
PROT SERPL-MCNC: 6.9 G/DL (ref 6.4–8.3)
SODIUM SERPL-SCNC: 142 MMOL/L (ref 136–145)
TSH SERPL DL<=0.005 MIU/L-ACNC: 0.24 UIU/ML (ref 0.3–4.2)

## 2022-12-05 LAB
FOLATE SERPL-MCNC: 4.7 NG/ML (ref 4.6–34.8)
VIT B12 SERPL-MCNC: 592 PG/ML (ref 232–1245)

## 2022-12-06 LAB — ANA SER QL IF: NEGATIVE

## 2022-12-30 ENCOUNTER — TRANSFERRED RECORDS (OUTPATIENT)
Dept: HEALTH INFORMATION MANAGEMENT | Facility: CLINIC | Age: 62
End: 2022-12-30

## 2023-01-12 ENCOUNTER — ANCILLARY PROCEDURE (OUTPATIENT)
Dept: MAMMOGRAPHY | Facility: CLINIC | Age: 63
End: 2023-01-12
Attending: FAMILY MEDICINE
Payer: MEDICARE

## 2023-01-12 DIAGNOSIS — Z12.31 BREAST CANCER SCREENING BY MAMMOGRAM: ICD-10-CM

## 2023-01-12 PROCEDURE — 77067 SCR MAMMO BI INCL CAD: CPT

## 2023-05-22 ENCOUNTER — LAB REQUISITION (OUTPATIENT)
Dept: LAB | Facility: CLINIC | Age: 63
End: 2023-05-22
Payer: MEDICARE

## 2023-05-22 DIAGNOSIS — E03.9 HYPOTHYROIDISM, UNSPECIFIED: ICD-10-CM

## 2023-05-22 LAB
T4 FREE SERPL-MCNC: 1.57 NG/DL (ref 0.9–1.7)
TSH SERPL DL<=0.005 MIU/L-ACNC: 1.38 UIU/ML (ref 0.3–4.2)

## 2023-05-22 PROCEDURE — 84439 ASSAY OF FREE THYROXINE: CPT | Mod: ORL | Performed by: FAMILY MEDICINE

## 2023-05-22 PROCEDURE — 84443 ASSAY THYROID STIM HORMONE: CPT | Mod: ORL | Performed by: FAMILY MEDICINE

## 2023-06-09 ENCOUNTER — LAB REQUISITION (OUTPATIENT)
Dept: LAB | Facility: CLINIC | Age: 63
End: 2023-06-09
Payer: MEDICARE

## 2023-06-09 DIAGNOSIS — Z01.818 ENCOUNTER FOR OTHER PREPROCEDURAL EXAMINATION: ICD-10-CM

## 2023-06-09 LAB
ANION GAP SERPL CALCULATED.3IONS-SCNC: 12 MMOL/L (ref 7–15)
BUN SERPL-MCNC: 22.3 MG/DL (ref 8–23)
CALCIUM SERPL-MCNC: 9.3 MG/DL (ref 8.8–10.2)
CHLORIDE SERPL-SCNC: 99 MMOL/L (ref 98–107)
CREAT SERPL-MCNC: 0.93 MG/DL (ref 0.51–0.95)
DEPRECATED HCO3 PLAS-SCNC: 28 MMOL/L (ref 22–29)
GFR SERPL CREATININE-BSD FRML MDRD: 68 ML/MIN/1.73M2
GLUCOSE SERPL-MCNC: 104 MG/DL (ref 70–99)
POTASSIUM SERPL-SCNC: 4.9 MMOL/L (ref 3.4–5.3)
SODIUM SERPL-SCNC: 139 MMOL/L (ref 136–145)

## 2023-06-09 PROCEDURE — 80048 BASIC METABOLIC PNL TOTAL CA: CPT | Mod: ORL | Performed by: FAMILY MEDICINE

## 2023-08-02 DIAGNOSIS — I10 ESSENTIAL HYPERTENSION: ICD-10-CM

## 2023-08-02 RX ORDER — HYDROCHLOROTHIAZIDE 25 MG/1
TABLET ORAL
Qty: 90 TABLET | Refills: 0 | Status: SHIPPED | OUTPATIENT
Start: 2023-08-02 | End: 2024-01-19

## 2023-12-22 ENCOUNTER — TELEPHONE (OUTPATIENT)
Dept: OPHTHALMOLOGY | Facility: CLINIC | Age: 63
End: 2023-12-22

## 2023-12-22 NOTE — TELEPHONE ENCOUNTER
Pt is requesting new rx for    Prednisolone acetate 1% susp    Did not see on active med list please verify and send new rx. Thank you!    [facility]  [phone]

## 2023-12-30 ENCOUNTER — TRANSFERRED RECORDS (OUTPATIENT)
Dept: HEALTH INFORMATION MANAGEMENT | Facility: CLINIC | Age: 63
End: 2023-12-30
Payer: MEDICARE

## 2024-01-02 ENCOUNTER — TRANSFERRED RECORDS (OUTPATIENT)
Dept: HEALTH INFORMATION MANAGEMENT | Facility: CLINIC | Age: 64
End: 2024-01-02
Payer: MEDICARE

## 2024-01-03 ENCOUNTER — MEDICAL CORRESPONDENCE (OUTPATIENT)
Dept: HEALTH INFORMATION MANAGEMENT | Facility: CLINIC | Age: 64
End: 2024-01-03
Payer: MEDICARE

## 2024-01-04 ENCOUNTER — TRANSCRIBE ORDERS (OUTPATIENT)
Dept: OTHER | Age: 64
End: 2024-01-04

## 2024-01-04 DIAGNOSIS — J44.1 COPD WITH EXACERBATION (H): Primary | ICD-10-CM

## 2024-01-05 DIAGNOSIS — J44.9 COPD (CHRONIC OBSTRUCTIVE PULMONARY DISEASE) (H): Primary | ICD-10-CM

## 2024-01-09 ENCOUNTER — OFFICE VISIT (OUTPATIENT)
Dept: CARDIOLOGY | Facility: CLINIC | Age: 64
End: 2024-01-09
Payer: MEDICARE

## 2024-01-09 VITALS
RESPIRATION RATE: 16 BRPM | DIASTOLIC BLOOD PRESSURE: 80 MMHG | WEIGHT: 230 LBS | HEART RATE: 71 BPM | SYSTOLIC BLOOD PRESSURE: 150 MMHG | OXYGEN SATURATION: 95 % | BODY MASS INDEX: 34.46 KG/M2

## 2024-01-09 DIAGNOSIS — R06.09 DYSPNEA ON EXERTION: ICD-10-CM

## 2024-01-09 DIAGNOSIS — I25.10 NONOBSTRUCTIVE ATHEROSCLEROSIS OF CORONARY ARTERY: ICD-10-CM

## 2024-01-09 DIAGNOSIS — R07.9 CHEST PAIN, UNSPECIFIED TYPE: ICD-10-CM

## 2024-01-09 DIAGNOSIS — I10 ESSENTIAL HYPERTENSION: Primary | ICD-10-CM

## 2024-01-09 DIAGNOSIS — E78.5 HYPERLIPIDEMIA, UNSPECIFIED HYPERLIPIDEMIA TYPE: ICD-10-CM

## 2024-01-09 PROCEDURE — 99214 OFFICE O/P EST MOD 30 MIN: CPT | Performed by: STUDENT IN AN ORGANIZED HEALTH CARE EDUCATION/TRAINING PROGRAM

## 2024-01-09 RX ORDER — HYDROCHLOROTHIAZIDE 25 MG/1
25 TABLET ORAL DAILY
Qty: 90 TABLET | Refills: 0 | Status: SHIPPED | OUTPATIENT
Start: 2024-01-09

## 2024-01-09 NOTE — PATIENT INSTRUCTIONS
Dayana Valverde,    It was a pleasure to see you today at the [facility].     My recommendations after this visit include:    - Restart hydrochlorothiazide 25 mg daily.   - Start monitoring BP at home (take 1-2 hours after eating and after medications when resting for 10 minutes)  - BMP in 1 week  - Schedule nuclear stress test  - Follow up with Dr. Roca in 1 year, sooner if needed pending results    - Please call [phone], if you have any questions or concerns      Karly Perez PA-C

## 2024-01-09 NOTE — LETTER
1/9/2024    Barbie Silver MD  [address] [place] [address]    RE: Dayana Valverde       Dear Colleague,     I had the pleasure of seeing Dayana Valverde in the [facility].    HEART CARE ENCOUNTER NOTE      [facility]  [phone]      Assessment/Recommendations   Assessment:   Nonobstructive coronary artery disease: Seen on coronary angiogram on 1/6/2021.  Patient has history of chest cramping but felt worse on antianginal therapy.  Patient does note exertional chest pain that seem to come back/worsen 6 months ago.  Patient notes it feels similar to what she had in 2021 when she had her workup.  Does not know if this ever fully improved.  Does feel that her shortness of breath has been worse since fall time and unsure if related to her COPD.  Essential hypertension: Hydrochlorothiazide 25 mg as needed. Patient notes this more for lower extremity swelling and hasn't taken it recently.  Reviewed recent BMP 6/9/2023 which was stable. BP from PCP last week was 130/80.  In clinic today blood pressure was 150/80.  Patient notes she will has some readings in the 140s when she rarely takes the blood pressure.  Recommended restarting the hydrochlorothiazide 25 mg daily instead of as needed has patient typically does not take it.  Patient recently got a BP cuff and will start monitoring at home.   Hyperlipidemia: Well-controlled on rosuvastatin 40 mg daily.  Last lipids 12/2/2022 showed LDL of 58.  COPD: Managed by her PCP.  Was recently on dose of antibiotic and prednisone.    Plan:   Restart hydrochlorothiazide 25 mg daily for better blood pressure control  Start monitoring blood pressure at home taking 1 to 2 hours after eating and after medications when resting for 10 minutes.  BMP and fasting cholesterol in 1 week  Schedule nuclear stress test to rule out any new ischemia that could be cause of patient's worsening chest pain and shortness of breath.  Likely this is  related to spasms versus macrovascular disease versus her COPD.    Follow-up with Dr. Roca in 1 year, sooner if needed pending results.        The level of medical decision making during this visit was of moderate complexity.    Follow up in 1 year with Dr. Roca.     History of Present Illness/Subjective    HPI: Dayana Valverde is a 63 year old female with PMHx of COPD, nonobstructive coronary artery disease, essential hypertension, hyperlipidemia presents for follow-up.  Patient was originally seen by Dr. Roca due to exertional dyspnea and chest discomfort with stress test suggesting ischemia.  Subsequent angiogram in 2021 had shown 60% mid RCA stenosis but negative FFR of 0.87.  Patient was started on metoprolol which seem to make symptoms worse and was switched to isosorbide mononitrate which led to shortness of breath and swelling.  This was stopped.  Hydrochlorothiazide was started which seemed to help symptoms.  Patient notes currently only taking hydrochlorothiazide as needed if she notices lower extremity edema.    Patient notes lately she has been dealing with a lot of stress.  Notes her  was diagnosed with throat cancer and her son has liver disease.  Patient notes she is continuing to have shortness of breath which seems to be worse since fall time.  Also notes that her chest pain with exertion came back and was worse starting in the summer/fall.  Patient notes that she gets it more with exertion and is improved with rest.  This is similar to symptoms she was having in 2021 when she had the stress test and coronary angiogram that showed nonobstructive disease.  Again no improvement on Imdur or metoprolol and patient had side effects on these.  Patient does have COPD and was recently treated with antibiotics and prednisone for possible acute flare.  Patient also notes she had decreased her dose of fluoxetine around the same time.  Feels her shortness of breath improved slightly and unsure if it  was related to COPD treatment or decreasing fluoxetine dose.  Patient does endorse some lightheadedness on and off for the past couple weeks.  Denies feeling that she is going to pass out.  Patient typically is followed with her PCP for her COPD but notes she may be seeing pulmonologist soon.  She denies fatigue, orthopnea, PND, palpitations, abdominal fullness/bloating, and lower extremity edema.        Echocardiogram 1/6/2021 results:  1. Normal left ventricular size and systolic performance with a visually estimated ejection fraction of 60%.   2. No significant valvular heart disease is identified on this study.   3. Normal right ventricular size and systolic performance.     Coronary angiogram 1/6/2021:    Mid RCA lesion is 60% stenosed.    Pressure wire/FFR was performed on the lesion. pre diagnositic: 0.95. post diagnostic: 0.87.    Pressure wire/FFR was performed on the lesion.    The LM vessel was moderate and is considered normal.    Estimated blood loss was <20 ml.    The LAD vessel was moderate .    The left circumflex was moderate and is considered normal.     Physical Examination  Review of Systems   Vitals: BP (!) 150/80 (BP Location: Left arm, Patient Position: Sitting, Cuff Size: Adult Large)   Pulse 71   Resp 16   Wt 104.3 kg (230 lb)   LMP 11/30/1997   SpO2 95%   BMI 34.46 kg/m    BMI= Body mass index is 34.46 kg/m .  Wt Readings from Last 3 Encounters:   01/09/24 104.3 kg (230 lb)   11/04/22 100.5 kg (221 lb 9.6 oz)   07/12/22 108.9 kg (240 lb)           ENT/Mouth: membranes moist, no oral lesions or bleeding gums.      EYES:  no scleral icterus, normal conjunctivae                    Neck: No carotid bruit    Chest/Lungs:   lungs are clear to auscultation, no rales or wheezing,  equal chest wall expansion    Cardiovascular:   Regular. Normal first and second heart sounds with no murmurs, rubs, or gallops; the carotid, radial pulses are intact, no edema bilaterally        Extremities: no  cyanosis or clubbing   Skin: no xanthelasma, warm.    Neurologic: no tremors     Psychiatric: alert and oriented x3, calm        Please refer above for cardiac ROS details.        Medical History  Surgical History Family History Social History   Past Medical History:   Diagnosis Date    Cancer (H)     Melanoma    Complication of anesthesia     COPD (chronic obstructive pulmonary disease) (H)     per chart    Dyspnea on exertion     GERD (gastroesophageal reflux disease)     Hyperlipidemia     Hypothyroidism     PONV (postoperative nausea and vomiting)      Past Surgical History:   Procedure Laterality Date    BIOPSY NODE SENTINEL Right 12/31/2019    Procedure: [place] lymph node biopsy;  Surgeon: Tramaine Garland MD;  Location: [facility] OR    CV CORONARY ANGIOGRAM N/A 1/6/2021    Procedure: Coronary Angiogram;  Surgeon: Nimesh Hawley MD;  Location: [facility] Cardiac Cath Lab;  Service: Cardiology    CV LEFT HEART CATHETERIZATION WITHOUT LEFT VENTRICULOGRAM Left 1/6/2021    Procedure: Left Heart Catheterization Without Left Ventriculogram;  Surgeon: Nimesh Hawley MD;  Location: [facility] Cardiac Cath Lab;  Service: Cardiology    EXCISE LESION BACK Right 12/31/2019    Procedure: Wide local excision of right back melanoma;  Surgeon: Tramaine Garland MD;  Location: [facility] OR    HYSTERECTOMY      TOTAL HIP ARTHROPLASTY Right 2017     Family History   Problem Relation Age of Onset    Rheumatoid Arthritis Other     Osteoarthritis Mother     Hypertension Mother     Goiter Mother     Arthritis Other     Cancer Other     Leukemia Other     Cerebrovascular Disease Father     Heart Disease Father     Diabetes Other         Social History     Socioeconomic History    Marital status:      Spouse name: Not on file    Number of children: Not on file    Years of education: Not on file    Highest education level: Not on file   Occupational History    Not on file   Tobacco Use    Smoking status: Former     Types:  Cigarettes     Quit date: 2008     Years since quittin.0    Smokeless tobacco: Never   Substance and Sexual Activity    Alcohol use: No    Drug use: Not on file    Sexual activity: Not on file   Other Topics Concern    Not on file   Social History Narrative    Not on file     Social Determinants of Health     Financial Resource Strain: Not on file   Food Insecurity: Not on file   Transportation Needs: Not on file   Physical Activity: Not on file   Stress: Not on file   Social Connections: Not on file   Interpersonal Safety: Not on file   Housing Stability: Not on file           Medications  Allergies   Current Outpatient Medications   Medication Sig Dispense Refill    albuterol (PROVENTIL) (5 MG/ML) 0.5% neb solution Inhale 2.5 mg into the lungs as needed      aspirin (ASA) 81 MG chewable tablet Take 81 mg by mouth daily      biotin 1000 MCG TABS tablet Take 1 capsule by mouth daily      budesonide-formoterol (SYMBICORT) 160-4.5 MCG/ACT Inhaler Inhale 2 puffs into the lungs 2 times daily      Cholecalciferol (VITAMIN D-3) 125 MCG (5000 UT) TABS Take 5,000 mg by mouth daily      FLUoxetine (PROZAC) 40 MG capsule Take 40 mg by mouth daily      hydrochlorothiazide (HYDRODIURIL) 25 MG tablet TAKE ONE TABLET BY MOUTH ONCE DAILY 90 tablet 0    levothyroxine (SYNTHROID/LEVOTHROID) 112 MCG tablet Take 112 mcg by mouth daily      magnesium 100 MG CAPS Every 3 days      meloxicam (MOBIC) 15 MG tablet Take 15 mg by mouth daily      nitroGLYcerin (NITROSTAT) 0.4 MG sublingual tablet Place under the tongue as needed      omeprazole (PRILOSEC) 40 MG DR capsule Take 40 mg by mouth daily      POTASSIUM CHLORIDE ER PO       rosuvastatin (CRESTOR) 40 MG tablet TAKE ONE TABLET BY MOUTH ONCE DAILY AT BEDTIME 90 tablet 3    tiotropium (SPIRIVA RESPIMAT) 2.5 MCG/ACT inhaler Inhale 2 puffs into the lungs 2 times daily         Allergies   Allergen Reactions    Sodium Pentobarbital [Pentobarbital] Other (See Comments)     Sodium  "Pentothol  Patient does not wake up from this medication    Naproxen      Other: GI Upset            Lab Results    Chemistry/lipid CBC Cardiac Enzymes/BNP/TSH/INR   Recent Labs   Lab Test 12/02/22  0949   CHOL 127   HDL 48*   LDL 58   TRIG 106     Recent Labs   Lab Test 12/02/22  0949 11/26/21  0914 01/06/21  0711   LDL 58 68 65     Recent Labs   Lab Test 06/09/23  0752      POTASSIUM 4.9   CHLORIDE 99   CO2 28   *   BUN 22.3   CR 0.93   GFRESTIMATED 68   SHANNON 9.3     Recent Labs   Lab Test 06/09/23  0752 12/02/22  0949 11/26/21  0914   CR 0.93 0.93 0.86     No results for input(s): \"A1C\" in the last 24792 hours.       Recent Labs   Lab Test 01/06/21  0711   WBC 7.7   HGB 12.9   HCT 39.8   MCV 92        Recent Labs   Lab Test 01/06/21  0711   HGB 12.9    No results for input(s): \"TROPONINI\" in the last 09779 hours.  No results for input(s): \"BNP\", \"NTBNPI\", \"NTBNP\" in the last 10479 hours.  Recent Labs   Lab Test 05/22/23  0929   TSH 1.38     No results for input(s): \"INR\" in the last 44210 hours.     Karly Perez PA-C    Thank you for allowing me to participate in the care of your patient.      Sincerely,   Karly Perez PA-C   [facility]  cc: No referring provider defined for this encounter.  "

## 2024-01-09 NOTE — PROGRESS NOTES
HEART CARE ENCOUNTER NOTE      [facility]  [phone]      Assessment/Recommendations   Assessment:   Nonobstructive coronary artery disease: Seen on coronary angiogram on 1/6/2021.  Patient has history of chest cramping but felt worse on antianginal therapy.  Patient does note exertional chest pain that seem to come back/worsen 6 months ago.  Patient notes it feels similar to what she had in 2021 when she had her workup.  Does not know if this ever fully improved.  Does feel that her shortness of breath has been worse since fall time and unsure if related to her COPD.  Essential hypertension: Hydrochlorothiazide 25 mg as needed. Patient notes this more for lower extremity swelling and hasn't taken it recently.  Reviewed recent BMP 6/9/2023 which was stable. BP from PCP last week was 130/80.  In clinic today blood pressure was 150/80.  Patient notes she will has some readings in the 140s when she rarely takes the blood pressure.  Recommended restarting the hydrochlorothiazide 25 mg daily instead of as needed has patient typically does not take it.  Patient recently got a BP cuff and will start monitoring at home.   Hyperlipidemia: Well-controlled on rosuvastatin 40 mg daily.  Last lipids 12/2/2022 showed LDL of 58.  COPD: Managed by her PCP.  Was recently on dose of antibiotic and prednisone.    Plan:   Restart hydrochlorothiazide 25 mg daily for better blood pressure control  Start monitoring blood pressure at home taking 1 to 2 hours after eating and after medications when resting for 10 minutes.  BMP and fasting cholesterol in 1 week  Schedule nuclear stress test to rule out any new ischemia that could be cause of patient's worsening chest pain and shortness of breath.  Likely this is related to spasms versus microvascular disease versus her COPD.    Follow-up with Dr. Roca in 1 year, sooner if needed pending results.        The level of medical decision making during this visit was of  moderate complexity.    Follow up in 1 year with Dr. Roca.     History of Present Illness/Subjective    HPI: Dayana Valverde is a 63 year old female with PMHx of COPD, nonobstructive coronary artery disease, essential hypertension, hyperlipidemia presents for follow-up.  Patient was originally seen by Dr. Roca due to exertional dyspnea and chest discomfort with stress test suggesting ischemia.  Subsequent angiogram in 2021 had shown 60% mid RCA stenosis but negative FFR of 0.87.  Patient was started on metoprolol which seem to make symptoms worse and was switched to isosorbide mononitrate which led to shortness of breath and swelling.  This was stopped.  Hydrochlorothiazide was started which seemed to help symptoms.  Patient notes currently only taking hydrochlorothiazide as needed if she notices lower extremity edema.    Patient notes lately she has been dealing with a lot of stress.  Notes her  was diagnosed with throat cancer and her son has liver disease.  Patient notes she is continuing to have shortness of breath which seems to be worse since fall time.  Also notes that her chest pain with exertion came back and was worse starting in the summer/fall.  Patient notes that she gets it more with exertion and is improved with rest.  This is similar to symptoms she was having in 2021 when she had the stress test and coronary angiogram that showed nonobstructive disease.  Again no improvement on Imdur or metoprolol and patient had side effects on these.  Patient does have COPD and was recently treated with antibiotics and prednisone for possible acute flare.  Patient also notes she had decreased her dose of fluoxetine around the same time.  Feels her shortness of breath improved slightly and unsure if it was related to COPD treatment or decreasing fluoxetine dose.  Patient does endorse some lightheadedness on and off for the past couple weeks.  Denies feeling that she is going to pass out.  Patient  typically is followed with her PCP for her COPD but notes she may be seeing pulmonologist soon.  She denies fatigue, orthopnea, PND, palpitations, abdominal fullness/bloating, and lower extremity edema.        Echocardiogram 1/6/2021 results:  1. Normal left ventricular size and systolic performance with a visually estimated ejection fraction of 60%.   2. No significant valvular heart disease is identified on this study.   3. Normal right ventricular size and systolic performance.     Coronary angiogram 1/6/2021:    Mid RCA lesion is 60% stenosed.    Pressure wire/FFR was performed on the lesion. pre diagnositic: 0.95. post diagnostic: 0.87.    Pressure wire/FFR was performed on the lesion.    The LM vessel was moderate and is considered normal.    Estimated blood loss was <20 ml.    The LAD vessel was moderate .    The left circumflex was moderate and is considered normal.     Physical Examination  Review of Systems   Vitals: BP (!) 150/80 (BP Location: Left arm, Patient Position: Sitting, Cuff Size: Adult Large)   Pulse 71   Resp 16   Wt 104.3 kg (230 lb)   LMP 11/30/1997   SpO2 95%   BMI 34.46 kg/m    BMI= Body mass index is 34.46 kg/m .  Wt Readings from Last 3 Encounters:   01/09/24 104.3 kg (230 lb)   11/04/22 100.5 kg (221 lb 9.6 oz)   07/12/22 108.9 kg (240 lb)           ENT/Mouth: membranes moist, no oral lesions or bleeding gums.      EYES:  no scleral icterus, normal conjunctivae                    Neck: No carotid bruit    Chest/Lungs:   lungs are clear to auscultation, no rales or wheezing,  equal chest wall expansion    Cardiovascular:   Regular. Normal first and second heart sounds with no murmurs, rubs, or gallops; the carotid, radial pulses are intact, no edema bilaterally        Extremities: no cyanosis or clubbing   Skin: no xanthelasma, warm.    Neurologic: no tremors     Psychiatric: alert and oriented x3, calm        Please refer above for cardiac ROS details.        Medical History   Surgical History Family History Social History   Past Medical History:   Diagnosis Date    Cancer (H)     Melanoma    Complication of anesthesia     COPD (chronic obstructive pulmonary disease) (H)     per chart    Dyspnea on exertion     GERD (gastroesophageal reflux disease)     Hyperlipidemia     Hypothyroidism     PONV (postoperative nausea and vomiting)      Past Surgical History:   Procedure Laterality Date    BIOPSY NODE SENTINEL Right 2019    Procedure: [place] lymph node biopsy;  Surgeon: Tramaine Garland MD;  Location: [facility] OR    CV CORONARY ANGIOGRAM N/A 2021    Procedure: Coronary Angiogram;  Surgeon: Nimesh Hawley MD;  Location: [facility] Cardiac Cath Lab;  Service: Cardiology    CV LEFT HEART CATHETERIZATION WITHOUT LEFT VENTRICULOGRAM Left 2021    Procedure: Left Heart Catheterization Without Left Ventriculogram;  Surgeon: Nimesh Hawley MD;  Location: [facility] Cardiac Cath Lab;  Service: Cardiology    EXCISE LESION BACK Right 2019    Procedure: Wide local excision of right back melanoma;  Surgeon: Tramaine Garland MD;  Location: [facility] OR    HYSTERECTOMY      TOTAL HIP ARTHROPLASTY Right 2017     Family History   Problem Relation Age of Onset    Rheumatoid Arthritis Other     Osteoarthritis Mother     Hypertension Mother     Goiter Mother     Arthritis Other     Cancer Other     Leukemia Other     Cerebrovascular Disease Father     Heart Disease Father     Diabetes Other         Social History     Socioeconomic History    Marital status:      Spouse name: Not on file    Number of children: Not on file    Years of education: Not on file    Highest education level: Not on file   Occupational History    Not on file   Tobacco Use    Smoking status: Former     Types: Cigarettes     Quit date: 2008     Years since quittin.0    Smokeless tobacco: Never   Substance and Sexual Activity    Alcohol use: No    Drug use: Not on file    Sexual activity: Not on file    Other Topics Concern    Not on file   Social History Narrative    Not on file     Social Determinants of Health     Financial Resource Strain: Not on file   Food Insecurity: Not on file   Transportation Needs: Not on file   Physical Activity: Not on file   Stress: Not on file   Social Connections: Not on file   Interpersonal Safety: Not on file   Housing Stability: Not on file           Medications  Allergies   Current Outpatient Medications   Medication Sig Dispense Refill    albuterol (PROVENTIL) (5 MG/ML) 0.5% neb solution Inhale 2.5 mg into the lungs as needed      aspirin (ASA) 81 MG chewable tablet Take 81 mg by mouth daily      biotin 1000 MCG TABS tablet Take 1 capsule by mouth daily      budesonide-formoterol (SYMBICORT) 160-4.5 MCG/ACT Inhaler Inhale 2 puffs into the lungs 2 times daily      Cholecalciferol (VITAMIN D-3) 125 MCG (5000 UT) TABS Take 5,000 mg by mouth daily      FLUoxetine (PROZAC) 40 MG capsule Take 40 mg by mouth daily      hydrochlorothiazide (HYDRODIURIL) 25 MG tablet TAKE ONE TABLET BY MOUTH ONCE DAILY 90 tablet 0    levothyroxine (SYNTHROID/LEVOTHROID) 112 MCG tablet Take 112 mcg by mouth daily      magnesium 100 MG CAPS Every 3 days      meloxicam (MOBIC) 15 MG tablet Take 15 mg by mouth daily      nitroGLYcerin (NITROSTAT) 0.4 MG sublingual tablet Place under the tongue as needed      omeprazole (PRILOSEC) 40 MG DR capsule Take 40 mg by mouth daily      POTASSIUM CHLORIDE ER PO       rosuvastatin (CRESTOR) 40 MG tablet TAKE ONE TABLET BY MOUTH ONCE DAILY AT BEDTIME 90 tablet 3    tiotropium (SPIRIVA RESPIMAT) 2.5 MCG/ACT inhaler Inhale 2 puffs into the lungs 2 times daily         Allergies   Allergen Reactions    Sodium Pentobarbital [Pentobarbital] Other (See Comments)     Sodium Pentothol  Patient does not wake up from this medication    Naproxen      Other: GI Upset            Lab Results    Chemistry/lipid CBC Cardiac Enzymes/BNP/TSH/INR   Recent Labs   Lab Test 12/02/22  0949  "  CHOL 127   HDL 48*   LDL 58   TRIG 106     Recent Labs   Lab Test 12/02/22  0949 11/26/21  0914 01/06/21  0711   LDL 58 68 65     Recent Labs   Lab Test 06/09/23  0752      POTASSIUM 4.9   CHLORIDE 99   CO2 28   *   BUN 22.3   CR 0.93   GFRESTIMATED 68   SHANNON 9.3     Recent Labs   Lab Test 06/09/23  0752 12/02/22  0949 11/26/21  0914   CR 0.93 0.93 0.86     No results for input(s): \"A1C\" in the last 24643 hours.       Recent Labs   Lab Test 01/06/21  0711   WBC 7.7   HGB 12.9   HCT 39.8   MCV 92        Recent Labs   Lab Test 01/06/21  0711   HGB 12.9    No results for input(s): \"TROPONINI\" in the last 67383 hours.  No results for input(s): \"BNP\", \"NTBNPI\", \"NTBNP\" in the last 97856 hours.  Recent Labs   Lab Test 05/22/23  0929   TSH 1.38     No results for input(s): \"INR\" in the last 40070 hours.     Karly Perez PA-C                                       "

## 2024-01-11 ENCOUNTER — HOSPITAL ENCOUNTER (OUTPATIENT)
Dept: NUCLEAR MEDICINE | Facility: CLINIC | Age: 64
Discharge: HOME | End: 2024-01-11
Attending: STUDENT IN AN ORGANIZED HEALTH CARE EDUCATION/TRAINING PROGRAM
Payer: MEDICARE

## 2024-01-11 ENCOUNTER — HOSPITAL ENCOUNTER (OUTPATIENT)
Dept: CARDIOLOGY | Facility: CLINIC | Age: 64
Discharge: HOME | End: 2024-01-11
Attending: STUDENT IN AN ORGANIZED HEALTH CARE EDUCATION/TRAINING PROGRAM
Payer: MEDICARE

## 2024-01-11 DIAGNOSIS — R06.09 DYSPNEA ON EXERTION: ICD-10-CM

## 2024-01-11 DIAGNOSIS — I25.10 NONOBSTRUCTIVE ATHEROSCLEROSIS OF CORONARY ARTERY: ICD-10-CM

## 2024-01-11 DIAGNOSIS — R07.9 CHEST PAIN, UNSPECIFIED TYPE: ICD-10-CM

## 2024-01-11 LAB
NUC STRESS EJECTION FRACTION: 72 %
STRESS ECHO TARGET HR: 155

## 2024-01-11 PROCEDURE — A9500 TC99M SESTAMIBI: HCPCS | Performed by: STUDENT IN AN ORGANIZED HEALTH CARE EDUCATION/TRAINING PROGRAM

## 2024-01-11 PROCEDURE — 78452 HT MUSCLE IMAGE SPECT MULT: CPT | Mod: ME

## 2024-01-11 PROCEDURE — 93018 CV STRESS TEST I&R ONLY: CPT | Performed by: INTERNAL MEDICINE

## 2024-01-11 PROCEDURE — 343N000001 HC RX 343: Performed by: STUDENT IN AN ORGANIZED HEALTH CARE EDUCATION/TRAINING PROGRAM

## 2024-01-11 PROCEDURE — 93016 CV STRESS TEST SUPVJ ONLY: CPT | Performed by: INTERNAL MEDICINE

## 2024-01-11 PROCEDURE — 250N000011 HC RX IP 250 OP 636: Performed by: STUDENT IN AN ORGANIZED HEALTH CARE EDUCATION/TRAINING PROGRAM

## 2024-01-11 PROCEDURE — 93017 CV STRESS TEST TRACING ONLY: CPT

## 2024-01-11 PROCEDURE — 78452 HT MUSCLE IMAGE SPECT MULT: CPT | Mod: 26 | Performed by: INTERNAL MEDICINE

## 2024-01-11 PROCEDURE — G1010 CDSM STANSON: HCPCS | Performed by: INTERNAL MEDICINE

## 2024-01-11 RX ORDER — AMINOPHYLLINE 25 MG/ML
50 INJECTION, SOLUTION INTRAVENOUS
Status: DISCONTINUED | OUTPATIENT
Start: 2024-01-11 | End: 2024-01-11 | Stop reason: HOSPADM

## 2024-01-11 RX ORDER — REGADENOSON 0.08 MG/ML
0.4 INJECTION, SOLUTION INTRAVENOUS ONCE
Status: COMPLETED | OUTPATIENT
Start: 2024-01-11 | End: 2024-01-11

## 2024-01-11 RX ADMIN — REGADENOSON 0.4 MG: 0.08 INJECTION, SOLUTION INTRAVENOUS at 08:58

## 2024-01-11 RX ADMIN — AMINOPHYLLINE 50 MG: 25 INJECTION, SOLUTION INTRAVENOUS at 09:01

## 2024-01-11 RX ADMIN — Medication 38.2 MILLICURIE: at 09:00

## 2024-01-11 RX ADMIN — Medication 8.3 MILLICURIE: at 08:11

## 2024-01-11 NOTE — PROGRESS NOTES
Pt having increase DELAROSA and chest tightness over the last 6 months.  Pt has high stress in life now.  Pt has hx of nonobstructive CAD.   Here for Cardiac evaluation.  Rosario Belcher RN

## 2024-01-17 ENCOUNTER — LAB (OUTPATIENT)
Dept: CARDIOLOGY | Facility: CLINIC | Age: 64
End: 2024-01-17
Payer: MEDICARE

## 2024-01-17 DIAGNOSIS — E78.5 HYPERLIPIDEMIA, UNSPECIFIED HYPERLIPIDEMIA TYPE: ICD-10-CM

## 2024-01-17 DIAGNOSIS — I10 ESSENTIAL HYPERTENSION: ICD-10-CM

## 2024-01-17 LAB
ANION GAP SERPL CALCULATED.3IONS-SCNC: 10 MMOL/L (ref 7–15)
BUN SERPL-MCNC: 21 MG/DL (ref 8–23)
CALCIUM SERPL-MCNC: 9.4 MG/DL (ref 8.8–10.2)
CHLORIDE SERPL-SCNC: 97 MMOL/L (ref 98–107)
CHOLEST SERPL-MCNC: 142 MG/DL
CREAT SERPL-MCNC: 1.03 MG/DL (ref 0.51–0.95)
DEPRECATED HCO3 PLAS-SCNC: 32 MMOL/L (ref 22–29)
EGFRCR SERPLBLD CKD-EPI 2021: 60 ML/MIN/1.73M2
FASTING STATUS PATIENT QL REPORTED: YES
GLUCOSE SERPL-MCNC: 99 MG/DL (ref 70–99)
HDLC SERPL-MCNC: 54 MG/DL
LDLC SERPL CALC-MCNC: 70 MG/DL
NONHDLC SERPL-MCNC: 88 MG/DL
POTASSIUM SERPL-SCNC: 4.4 MMOL/L (ref 3.4–5.3)
SODIUM SERPL-SCNC: 139 MMOL/L (ref 135–145)
TRIGL SERPL-MCNC: 90 MG/DL

## 2024-01-17 PROCEDURE — 80048 BASIC METABOLIC PNL TOTAL CA: CPT

## 2024-01-17 PROCEDURE — 36415 COLL VENOUS BLD VENIPUNCTURE: CPT

## 2024-01-17 PROCEDURE — 80061 LIPID PANEL: CPT

## 2024-01-19 ENCOUNTER — OFFICE VISIT (OUTPATIENT)
Dept: PULMONOLOGY | Facility: CLINIC | Age: 64
End: 2024-01-19
Payer: MEDICARE

## 2024-01-19 VITALS
HEART RATE: 76 BPM | DIASTOLIC BLOOD PRESSURE: 80 MMHG | WEIGHT: 226 LBS | SYSTOLIC BLOOD PRESSURE: 142 MMHG | BODY MASS INDEX: 33.86 KG/M2 | OXYGEN SATURATION: 93 %

## 2024-01-19 DIAGNOSIS — J44.9 COPD (CHRONIC OBSTRUCTIVE PULMONARY DISEASE) (H): ICD-10-CM

## 2024-01-19 DIAGNOSIS — J44.1 COPD WITH EXACERBATION (H): ICD-10-CM

## 2024-01-19 DIAGNOSIS — J44.9 COPD, GROUP B, BY GOLD 2017 CLASSIFICATION (H): Primary | ICD-10-CM

## 2024-01-19 LAB — HGB BLD-MCNC: 14 G/DL

## 2024-01-19 PROCEDURE — 94726 PLETHYSMOGRAPHY LUNG VOLUMES: CPT | Performed by: INTERNAL MEDICINE

## 2024-01-19 PROCEDURE — 85018 HEMOGLOBIN: CPT | Mod: QW | Performed by: INTERNAL MEDICINE

## 2024-01-19 PROCEDURE — 94060 EVALUATION OF WHEEZING: CPT | Performed by: INTERNAL MEDICINE

## 2024-01-19 PROCEDURE — 99204 OFFICE O/P NEW MOD 45 MIN: CPT | Performed by: INTERNAL MEDICINE

## 2024-01-19 PROCEDURE — 94729 DIFFUSING CAPACITY: CPT | Performed by: INTERNAL MEDICINE

## 2024-01-19 NOTE — PROGRESS NOTES
Pulmonary Clinic Outpatient Consultation    Assessment and Plan:   63F with a history of COPD, emphysema, GERD, HLD, hypothyroidism, history of melanoma, presents for COPD evaluation. PFTs today showed very severe obstruction, positive bronchodilator response, hyperinflation and air trapping and moderate diffusion impairment, consistent with COPD. She likely has GOLD B COPD (higher symptom burden, lower risk of hospitalization). We discussed outpatient management of COPD with emphasis on optimizing inhaler regimen, getting her enrolled in pulmonary rehab, ensuring appropriate vaccinations, etc.     Based on walking oximetry today she does qualify for 2Lpm oxygen.     Recommendations:  - continue Symbicort 160-4.5 2 puffs BID. Rinse/gargle/spit after use  - continue Spiriva respimat, 2 puffs once daily  - continue albuterol rescue inhaler as needed  - declines pulmonary rehab referral today  - we'll prescibe oxygen 2Lpm to be used with activity.  Due to desaturation of SpO2 less than or equal to 88% on room air at rest from COPD, home oxygen therapy will benefit my patient's condition. The patient has tried other medications including inhaled and nebulized therapy and steroids with limited success and oxygen is still required. The patient is mobile in the home and requires portability.   - enocuraged her to exercise and remain active as able  - does not qualify for LDCT for lung cancer screening as her quit date was >15 years ago.    Follow up in 6 months.     Hemal Hernandez MD (Avi)  [facility] ([facility])  Clinic [phone]  Fax [phone]     CCx: COPD evaluation    HPI: 63F with a history of COPD, emphysema, GERD, HLD, hypothyroidism, history of melanoma, presents for COPD evaluation. She was referred by her PMD at [facility]. She has chronic cough and dyspnea on exertion. She says her SpO2 at home goes down to 84% on occasion. She has dyspnea with one flight of stairs.    She uses Symbicort and spiriva which work well for her. Unfortunately her  was just diagnosed with throat cancer so she cannot commit to any pulmonary rehab program at the moment.   She is retired. She smoked 1-2 ppd for 30+ years; quit in 2008.      ROS:  A 12-system review was obtained and was negative with the exception of the symptoms endorsed in the history of present illness.    PMH:  Past Medical History:   Diagnosis Date    Cancer (H)     Melanoma    Complication of anesthesia     COPD (chronic obstructive pulmonary disease) (H)     per chart    Dyspnea on exertion     GERD (gastroesophageal reflux disease)     Hyperlipidemia     Hypothyroidism     PONV (postoperative nausea and vomiting)         PSH:  Past Surgical History:   Procedure Laterality Date    BIOPSY NODE SENTINEL Right 12/31/2019    Procedure: [place] lymph node biopsy;  Surgeon: Tramaine Garland MD;  Location:  OR    CV CORONARY ANGIOGRAM N/A 1/6/2021    Procedure: Coronary Angiogram;  Surgeon: Nimesh Hawley MD;  Location: [facility] Cardiac Cath Lab;  Service: Cardiology    CV LEFT HEART CATHETERIZATION WITHOUT LEFT VENTRICULOGRAM Left 1/6/2021    Procedure: Left Heart Catheterization Without Left Ventriculogram;  Surgeon: Nimesh Hawley MD;  Location: [facility] Cardiac Cath Lab;  Service: Cardiology    EXCISE LESION BACK Right 12/31/2019    Procedure: Wide local excision of right back melanoma;  Surgeon: Tramaine Garland MD;  Location: [facility] OR    HYSTERECTOMY      TOTAL HIP ARTHROPLASTY Right 2017       Allergies:  Allergies   Allergen Reactions    Sodium Pentobarbital [Pentobarbital] Other (See Comments)     Sodium Pentothol  Patient does not wake up from this medication    Naproxen      Other: GI Upset         Family HX:  Family History   Problem Relation Age of Onset    Rheumatoid Arthritis Other     Osteoarthritis Mother     Hypertension Mother     Goiter Mother     Arthritis Other     Cancer Other     Leukemia  Other     Cerebrovascular Disease Father     Heart Disease Father     Diabetes Other        Social Hx:  Social History     Socioeconomic History    Marital status:      Spouse name: Not on file    Number of children: Not on file    Years of education: Not on file    Highest education level: Not on file   Occupational History    Not on file   Tobacco Use    Smoking status: Former     Types: Cigarettes     Quit date: 2008     Years since quittin.0    Smokeless tobacco: Never   Substance and Sexual Activity    Alcohol use: No    Drug use: Not on file    Sexual activity: Not on file   Other Topics Concern    Not on file   Social History Narrative    Not on file     Social Determinants of Health     Financial Resource Strain: Not on file   Food Insecurity: Not on file   Transportation Needs: Not on file   Physical Activity: Not on file   Stress: Not on file   Social Connections: Not on file   Interpersonal Safety: Not on file   Housing Stability: Not on file       Current Meds:  Current Outpatient Medications   Medication Sig Dispense Refill    albuterol (PROVENTIL) (5 MG/ML) 0.5% neb solution Inhale 2.5 mg into the lungs as needed      aspirin (ASA) 81 MG chewable tablet Take 81 mg by mouth daily      biotin 1000 MCG TABS tablet Take 1 capsule by mouth daily      budesonide-formoterol (SYMBICORT) 160-4.5 MCG/ACT Inhaler Inhale 2 puffs into the lungs 2 times daily      Cholecalciferol (VITAMIN D-3) 125 MCG (5000 UT) TABS Take 5,000 mg by mouth daily      FLUoxetine (PROZAC) 40 MG capsule Take 40 mg by mouth daily      hydrochlorothiazide (HYDRODIURIL) 25 MG tablet Take 1 tablet (25 mg) by mouth daily 90 tablet 0    hydrochlorothiazide (HYDRODIURIL) 25 MG tablet TAKE ONE TABLET BY MOUTH ONCE DAILY 90 tablet 0    levothyroxine (SYNTHROID/LEVOTHROID) 112 MCG tablet Take 112 mcg by mouth daily      magnesium 100 MG CAPS Every 3 days      meloxicam (MOBIC) 15 MG tablet Take 15 mg by mouth daily       nitroGLYcerin (NITROSTAT) 0.4 MG sublingual tablet Place under the tongue as needed      omeprazole (PRILOSEC) 40 MG DR capsule Take 40 mg by mouth daily      POTASSIUM CHLORIDE ER PO       rosuvastatin (CRESTOR) 40 MG tablet TAKE ONE TABLET BY MOUTH ONCE DAILY AT BEDTIME 90 tablet 3    tiotropium (SPIRIVA RESPIMAT) 2.5 MCG/ACT inhaler Inhale 2 puffs into the lungs 2 times daily         Physical Exam:  [facility] 11/30/1997   Gen: awake, alert, oriented, no distress  HEENT: nasal turbinates are unremarkable, no oropharyngeal lesions, no cervical or supraclavicular lymphadenopathy  CV: RRR, no M/G/R  Resp: diminished air entry. No wheezing or rhonchi.   Skin: no apparent rashes  Ext: no cyanosis, clubbing or edema  Neuro: alert, nonfocal    Labs:  Reviewed; unremarkable.     Imaging studies:  Personally reviewed the LDCT from Dec 2022  No suspicious findings. Pulmonary emphysema was seen.    Pulmonary Function Testing  1/19/2024  FEV1 0.93L, 37%  FVC 69%  +13% improvement in FVC with BD  Ratio 0.42  TLC 6.86L, 122%  %  Dlco 47% ewa for hgb  Flow volume curve shows obstruction.

## 2024-01-19 NOTE — PROGRESS NOTES
Patient oxygen saturation on RA at rest is 95%.  Oxygen saturation after ambulating 250ft on RA is 88%.    Starting oxygen saturations: 97%. After ambulating 400ft on 2LPM oxygen saturation is 94%.    Patient is ambulatory within his/her home.     Darwin DUNLAP CMA

## 2024-01-19 NOTE — LETTER
1/19/2024         RE: Dayana Valverde  [address] [place] [address]        Dear Colleague,    Thank you for referring your patient, Dayana Valverde, to the [facility]. Please see a copy of my visit note below.    Pulmonary Clinic Outpatient Consultation    Assessment and Plan:   63F with a history of COPD, emphysema, GERD, HLD, hypothyroidism, history of melanoma, presents for COPD evaluation. PFTs today showed very severe obstruction, positive bronchodilator response, hyperinflation and air trapping and moderate diffusion impairment, consistent with COPD. She likely has GOLD B COPD (higher symptom burden, lower risk of hospitalization). We discussed outpatient management of COPD with emphasis on optimizing inhaler regimen, getting her enrolled in pulmonary rehab, ensuring appropriate vaccinations, etc.     Based on walking oximetry today she does qualify for 2Lpm oxygen.     Recommendations:  - continue Symbicort 160-4.5 2 puffs BID. Rinse/gargle/spit after use  - continue Spiriva respimat, 2 puffs once daily  - continue albuterol rescue inhaler as needed  - declines pulmonary rehab referral today  - we'll prescibe oxygen 2Lpm to be used with activity.  Due to desaturation of SpO2 less than or equal to 88% on room air at rest from COPD, home oxygen therapy will benefit my patient's condition. The patient has tried other medications including inhaled and nebulized therapy and steroids with limited success and oxygen is still required. The patient is mobile in the home and requires portability.   - enocuraged her to exercise and remain active as able  - does not qualify for LDCT for lung cancer screening as her quit date was >15 years ago.    Follow up in 6 months.     Hemal Hernandez MD (Avi)  [facility] ([facility])  Clinic [phone]  Fax [phone]     CCx: COPD evaluation    HPI: 63F with a history of COPD, emphysema, GERD, HLD,  hypothyroidism, history of melanoma, presents for COPD evaluation. She was referred by her PMD at [facility]. She has chronic cough and dyspnea on exertion. She says her SpO2 at home goes down to 84% on occasion. She has dyspnea with one flight of stairs.   She uses Symbicort and spiriva which work well for her. Unfortunately her  was just diagnosed with throat cancer so she cannot commit to any pulmonary rehab program at the moment.   She is retired. She smoked 1-2 ppd for 30+ years; quit in 2008.      ROS:  A 12-system review was obtained and was negative with the exception of the symptoms endorsed in the history of present illness.    PMH:  Past Medical History:   Diagnosis Date     Cancer (H)     Melanoma     Complication of anesthesia      COPD (chronic obstructive pulmonary disease) (H)     per chart     Dyspnea on exertion      GERD (gastroesophageal reflux disease)      Hyperlipidemia      Hypothyroidism      PONV (postoperative nausea and vomiting)         PSH:  Past Surgical History:   Procedure Laterality Date     BIOPSY NODE SENTINEL Right 12/31/2019    Procedure: [place] lymph node biopsy;  Surgeon: Tramaine Garland MD;  [facility]     CV CORONARY ANGIOGRAM N/A 1/6/2021    Procedure: Coronary Angiogram;  Surgeon: Nimesh Hawley MD;  Location: [facility] Cardiac Cath Lab;  Service: Cardiology     CV LEFT HEART CATHETERIZATION WITHOUT LEFT VENTRICULOGRAM Left 1/6/2021    Procedure: Left Heart Catheterization Without Left Ventriculogram;  Surgeon: Nimesh Hawley MD;  Location: [facility] Cardiac Cath Lab;  Service: Cardiology     EXCISE LESION BACK Right 12/31/2019    Procedure: Wide local excision of right back melanoma;  Surgeon: Tramaine Garland MD;  Location: [facility] OR     HYSTERECTOMY       TOTAL HIP ARTHROPLASTY Right 2017       Allergies:  Allergies   Allergen Reactions     Sodium Pentobarbital [Pentobarbital] Other (See Comments)     Sodium Pentothol  Patient does not wake up  from this medication     Naproxen      Other: GI Upset         Family HX:  Family History   Problem Relation Age of Onset     Rheumatoid Arthritis Other      Osteoarthritis Mother      Hypertension Mother      Goiter Mother      Arthritis Other      Cancer Other      Leukemia Other      Cerebrovascular Disease Father      Heart Disease Father      Diabetes Other        Social Hx:  Social History     Socioeconomic History     Marital status:      Spouse name: Not on file     Number of children: Not on file     Years of education: Not on file     Highest education level: Not on file   Occupational History     Not on file   Tobacco Use     Smoking status: Former     Types: Cigarettes     Quit date: 2008     Years since quittin.0     Smokeless tobacco: Never   Substance and Sexual Activity     Alcohol use: No     Drug use: Not on file     Sexual activity: Not on file   Other Topics Concern     Not on file   Social History Narrative     Not on file     Social Determinants of Health     Financial Resource Strain: Not on file   Food Insecurity: Not on file   Transportation Needs: Not on file   Physical Activity: Not on file   Stress: Not on file   Social Connections: Not on file   Interpersonal Safety: Not on file   Housing Stability: Not on file       Current Meds:  Current Outpatient Medications   Medication Sig Dispense Refill     albuterol (PROVENTIL) (5 MG/ML) 0.5% neb solution Inhale 2.5 mg into the lungs as needed       aspirin (ASA) 81 MG chewable tablet Take 81 mg by mouth daily       biotin 1000 MCG TABS tablet Take 1 capsule by mouth daily       budesonide-formoterol (SYMBICORT) 160-4.5 MCG/ACT Inhaler Inhale 2 puffs into the lungs 2 times daily       Cholecalciferol (VITAMIN D-3) 125 MCG (5000 UT) TABS Take 5,000 mg by mouth daily       FLUoxetine (PROZAC) 40 MG capsule Take 40 mg by mouth daily       hydrochlorothiazide (HYDRODIURIL) 25 MG tablet Take 1 tablet (25 mg) by mouth daily 90 tablet 0      hydrochlorothiazide (HYDRODIURIL) 25 MG tablet TAKE ONE TABLET BY MOUTH ONCE DAILY 90 tablet 0     levothyroxine (SYNTHROID/LEVOTHROID) 112 MCG tablet Take 112 mcg by mouth daily       magnesium 100 MG CAPS Every 3 days       meloxicam (MOBIC) 15 MG tablet Take 15 mg by mouth daily       nitroGLYcerin (NITROSTAT) 0.4 MG sublingual tablet Place under the tongue as needed       omeprazole (PRILOSEC) 40 MG DR capsule Take 40 mg by mouth daily       POTASSIUM CHLORIDE ER PO        rosuvastatin (CRESTOR) 40 MG tablet TAKE ONE TABLET BY MOUTH ONCE DAILY AT BEDTIME 90 tablet 3     tiotropium (SPIRIVA RESPIMAT) 2.5 MCG/ACT inhaler Inhale 2 puffs into the lungs 2 times daily         Physical Exam:  [facility] 11/30/1997   Gen: awake, alert, oriented, no distress  HEENT: nasal turbinates are unremarkable, no oropharyngeal lesions, no cervical or supraclavicular lymphadenopathy  CV: RRR, no M/G/R  Resp: diminished air entry. No wheezing or rhonchi.   Skin: no apparent rashes  Ext: no cyanosis, clubbing or edema  Neuro: alert, nonfocal    Labs:  Reviewed; unremarkable.     Imaging studies:  Personally reviewed the LDCT from Dec 2022  No suspicious findings. Pulmonary emphysema was seen.    Pulmonary Function Testing  1/19/2024  FEV1 0.93L, 37%  FVC 69%  +13% improvement in FVC with BD  Ratio 0.42  TLC 6.86L, 122%  %  Dlco 47% ewa for hgb  Flow volume curve shows obstruction.     Patient oxygen saturation on RA at rest is 95%.  Oxygen saturation after ambulating 250ft on RA is 88%.    Starting oxygen saturations: 97%. After ambulating 400ft on 2LPM oxygen saturation is 94%.    Patient is ambulatory within his/her home.     Darwin DUNLAP CMA      Again, thank you for allowing me to participate in the care of your patient.        Sincerely,        Hemal Hernandez MD

## 2024-01-22 ENCOUNTER — TELEPHONE (OUTPATIENT)
Dept: PULMONOLOGY | Facility: CLINIC | Age: 64
End: 2024-01-22
Payer: MEDICARE

## 2024-01-22 NOTE — TELEPHONE ENCOUNTER
[facility] Call Center    Phone Message    May a detailed message be left on voicemail: yes     Reason for Call: Other: Dayana requesting a follow up call back she stated Dr. Hernandez told her she would get a call today about the rx for oxygen but as of this after 3:50 pm no calls received. Also she would like to know the names of the company that would be providing the oxygen thank you     Action Taken: Other: Pulm    Travel Screening: Not Applicable

## 2024-01-22 NOTE — TELEPHONE ENCOUNTER
DATE:  1/22/24  DME PROVIDER:  [facility]  SUPPLY ORDERED:  BLED INTO PAP  PROVIDER:  DR. MORENO      4:51 PM: Faxed prescription order to [place] [facility].    Darwin DUNLAP CMA

## 2024-01-23 ENCOUNTER — DOCUMENTATION ONLY (OUTPATIENT)
Dept: PULMONOLOGY | Facility: CLINIC | Age: 64
End: 2024-01-23
Payer: MEDICARE

## 2024-01-23 DIAGNOSIS — J96.21 ACUTE ON CHRONIC RESPIRATORY FAILURE WITH HYPOXEMIA (H): Primary | ICD-10-CM

## 2024-01-23 DIAGNOSIS — E78.5 HYPERLIPIDEMIA, UNSPECIFIED HYPERLIPIDEMIA TYPE: ICD-10-CM

## 2024-01-23 RX ORDER — ROSUVASTATIN CALCIUM 40 MG/1
TABLET, COATED ORAL
Qty: 90 TABLET | Refills: 3 | Status: SHIPPED | OUTPATIENT
Start: 2024-01-23

## 2024-01-23 NOTE — TELEPHONE ENCOUNTER
Called and spoke with patient.  Orders were faxed to [facility] late yesterday.  She should be getting a phone call from them today.  Gave her phone number for [facility] so she can contact them as well.

## 2024-01-25 ENCOUNTER — ALLIED HEALTH/NURSE VISIT (OUTPATIENT)
Dept: PULMONOLOGY | Facility: CLINIC | Age: 64
End: 2024-01-25
Payer: MEDICARE

## 2024-01-25 DIAGNOSIS — J44.9 COPD, GROUP B, BY GOLD 2017 CLASSIFICATION (H): Primary | ICD-10-CM

## 2024-01-25 DIAGNOSIS — J44.1 COPD WITH EXACERBATION (H): ICD-10-CM

## 2024-01-25 PROCEDURE — 99207 PR NO BILLABLE SERVICE THIS VISIT: CPT | Mod: 25

## 2024-01-25 NOTE — PROGRESS NOTES
PULSE DOSE WALK:    Starting settin; sats: 96%  Setting 2 after 250 feet sats are 88%.  Setting 3 after 50 feet sats are 87%.  Setting 4 after 50 feet sats are 88%.  Setting 5 after 150 feet sats are 93%.    Patient is ambulatory within his/her home.     Darwin DUNLAP CMA

## 2024-01-26 ENCOUNTER — MYC MEDICAL ADVICE (OUTPATIENT)
Dept: CARDIOLOGY | Facility: CLINIC | Age: 64
End: 2024-01-26
Payer: MEDICARE

## 2024-01-26 ENCOUNTER — TELEPHONE (OUTPATIENT)
Dept: PULMONOLOGY | Facility: CLINIC | Age: 64
End: 2024-01-26
Payer: MEDICARE

## 2024-01-26 DIAGNOSIS — I10 ESSENTIAL HYPERTENSION: Primary | ICD-10-CM

## 2024-01-26 LAB
DLCOCOR-%PRED-PRE: 47 %
DLCOCOR-PRE: 10.02 ML/MIN/MMHG
DLCOUNC-%PRED-PRE: 47 %
DLCOUNC-PRE: 10.19 ML/MIN/MMHG
DLCOUNC-PRED: 21.3 ML/MIN/MMHG
ERV-%PRED-PRE: 51 %
ERV-PRE: 0.62 L
ERV-PRED: 1.2 L
EXPTIME-PRE: 11.35 SEC
FEF2575-%PRED-POST: 18 %
FEF2575-%PRED-PRE: 14 %
FEF2575-POST: 0.38 L/SEC
FEF2575-PRE: 0.31 L/SEC
FEF2575-PRED: 2.1 L/SEC
FEFMAX-%PRED-PRE: 41 %
FEFMAX-PRE: 2.75 L/SEC
FEFMAX-PRED: 6.59 L/SEC
FEV1-%PRED-PRE: 37 %
FEV1-PRE: 0.93 L
FEV1FEV6-PRE: 45 %
FEV1FEV6-PRED: 80 %
FEV1FVC-PRE: 42 %
FEV1FVC-PRED: 79 %
FEV1SVC-PRE: 34 %
FEV1SVC-PRED: 69 %
FIFMAX-PRE: 5.36 L/SEC
FRCPLETH-%PRED-PRE: 161 %
FRCPLETH-PRE: 4.74 L
FRCPLETH-PRED: 2.93 L
FVC-%PRED-PRE: 69 %
FVC-PRE: 2.2 L
FVC-PRED: 3.15 L
IC-%PRED-PRE: 88 %
IC-PRE: 2.12 L
IC-PRED: 2.41 L
RVPLETH-%PRED-PRE: 189 %
RVPLETH-PRE: 4.13 L
RVPLETH-PRED: 2.18 L
TLCPLETH-%PRED-PRE: 122 %
TLCPLETH-PRE: 6.86 L
TLCPLETH-PRED: 5.61 L
VA-%PRED-PRE: 73 %
VA-PRE: 3.89 L
VC-%PRED-PRE: 76 %
VC-PRE: 2.74 L
VC-PRED: 3.59 L

## 2024-01-26 RX ORDER — LISINOPRIL 5 MG/1
5 TABLET ORAL DAILY
Qty: 90 TABLET | Refills: 3 | Status: SHIPPED | OUTPATIENT
Start: 2024-01-26 | End: 2024-02-05 | Stop reason: SINTOL

## 2024-01-26 RX ORDER — LISINOPRIL 5 MG/1
5 TABLET ORAL DAILY
Qty: 90 TABLET | Refills: 3 | Status: SHIPPED | OUTPATIENT
Start: 2024-01-26 | End: 2024-01-26

## 2024-01-26 NOTE — TELEPHONE ENCOUNTER
[facility] Call Center    Phone Message    May a detailed message be left on voicemail: yes     Reason for Call: Form or Letter   Type or form/letter needing completion: Oxygen order form to be completed and returned. Please also include the results from the most recent PFT. Please call for any questions. Thank you.   Provider: Dr. Hernandez  Date form needed: Asap  Once completed: Fax form to: [phone]    Action Taken: Other: Pulm    Travel Screening: Not Applicable

## 2024-01-26 NOTE — TELEPHONE ENCOUNTER
Can you help transfer her lisinopril from pharmacies?- sent in [facility] and patient wants mail order

## 2024-01-26 NOTE — TELEPHONE ENCOUNTER
Spoke with Dayana.  Dr. Hernandez not available to sign her order form for [employer] until next week.  She does not need PFT results to be sent with.   Thinks they WILL need the walk testing results.       Will fax on Wed after they are signed by Dr. Hernandez.

## 2024-02-01 ENCOUNTER — MEDICAL CORRESPONDENCE (OUTPATIENT)
Dept: HEALTH INFORMATION MANAGEMENT | Facility: CLINIC | Age: 64
End: 2024-02-01
Payer: MEDICARE

## 2024-02-05 DIAGNOSIS — I10 ESSENTIAL HYPERTENSION: Primary | ICD-10-CM

## 2024-02-05 RX ORDER — LOSARTAN POTASSIUM 25 MG/1
25 TABLET ORAL DAILY
Qty: 90 TABLET | Refills: 3 | Status: SHIPPED | OUTPATIENT
Start: 2024-02-05

## 2024-02-09 ENCOUNTER — LAB (OUTPATIENT)
Dept: CARDIOLOGY | Facility: CLINIC | Age: 64
End: 2024-02-09
Payer: MEDICARE

## 2024-02-09 DIAGNOSIS — I10 ESSENTIAL HYPERTENSION: ICD-10-CM

## 2024-02-09 LAB
ANION GAP SERPL CALCULATED.3IONS-SCNC: 12 MMOL/L (ref 7–15)
BUN SERPL-MCNC: 14.5 MG/DL (ref 8–23)
CALCIUM SERPL-MCNC: 9.1 MG/DL (ref 8.8–10.2)
CHLORIDE SERPL-SCNC: 98 MMOL/L (ref 98–107)
CREAT SERPL-MCNC: 0.93 MG/DL (ref 0.51–0.95)
DEPRECATED HCO3 PLAS-SCNC: 29 MMOL/L (ref 22–29)
EGFRCR SERPLBLD CKD-EPI 2021: 68 ML/MIN/1.73M2
GLUCOSE SERPL-MCNC: 64 MG/DL (ref 70–99)
POTASSIUM SERPL-SCNC: 4.6 MMOL/L (ref 3.4–5.3)
SODIUM SERPL-SCNC: 139 MMOL/L (ref 135–145)

## 2024-02-09 PROCEDURE — 80048 BASIC METABOLIC PNL TOTAL CA: CPT

## 2024-02-09 PROCEDURE — 36415 COLL VENOUS BLD VENIPUNCTURE: CPT

## 2024-04-04 ENCOUNTER — LAB REQUISITION (OUTPATIENT)
Dept: LAB | Facility: CLINIC | Age: 64
End: 2024-04-04
Payer: MEDICARE

## 2024-04-04 DIAGNOSIS — Z13.6 ENCOUNTER FOR SCREENING FOR CARDIOVASCULAR DISORDERS: ICD-10-CM

## 2024-04-04 DIAGNOSIS — E03.9 HYPOTHYROIDISM, UNSPECIFIED: ICD-10-CM

## 2024-04-04 DIAGNOSIS — E78.5 HYPERLIPIDEMIA, UNSPECIFIED: ICD-10-CM

## 2024-04-04 LAB
ALBUMIN SERPL BCG-MCNC: 4.4 G/DL (ref 3.5–5.2)
ALP SERPL-CCNC: 109 U/L (ref 40–150)
ALT SERPL W P-5'-P-CCNC: 41 U/L (ref 0–50)
ANION GAP SERPL CALCULATED.3IONS-SCNC: 15 MMOL/L (ref 7–15)
AST SERPL W P-5'-P-CCNC: 35 U/L (ref 0–45)
BILIRUB SERPL-MCNC: 0.6 MG/DL
BUN SERPL-MCNC: 18.5 MG/DL (ref 8–23)
CALCIUM SERPL-MCNC: 9.7 MG/DL (ref 8.8–10.2)
CHLORIDE SERPL-SCNC: 99 MMOL/L (ref 98–107)
CHOLEST SERPL-MCNC: 144 MG/DL
CREAT SERPL-MCNC: 0.9 MG/DL (ref 0.51–0.95)
DEPRECATED HCO3 PLAS-SCNC: 25 MMOL/L (ref 22–29)
EGFRCR SERPLBLD CKD-EPI 2021: 71 ML/MIN/1.73M2
FASTING STATUS PATIENT QL REPORTED: NORMAL
GLUCOSE SERPL-MCNC: 70 MG/DL (ref 70–99)
HDLC SERPL-MCNC: 53 MG/DL
LDLC SERPL CALC-MCNC: 68 MG/DL
NONHDLC SERPL-MCNC: 91 MG/DL
POTASSIUM SERPL-SCNC: 4.4 MMOL/L (ref 3.4–5.3)
PROT SERPL-MCNC: 7.3 G/DL (ref 6.4–8.3)
SODIUM SERPL-SCNC: 139 MMOL/L (ref 135–145)
T4 FREE SERPL-MCNC: 1.53 NG/DL (ref 0.9–1.7)
TRIGL SERPL-MCNC: 116 MG/DL
TSH SERPL DL<=0.005 MIU/L-ACNC: 1.28 UIU/ML (ref 0.3–4.2)

## 2024-04-04 PROCEDURE — 84443 ASSAY THYROID STIM HORMONE: CPT | Mod: ORL | Performed by: FAMILY MEDICINE

## 2024-04-04 PROCEDURE — 84439 ASSAY OF FREE THYROXINE: CPT | Mod: ORL | Performed by: FAMILY MEDICINE

## 2024-04-04 PROCEDURE — 80053 COMPREHEN METABOLIC PANEL: CPT | Mod: ORL | Performed by: FAMILY MEDICINE

## 2024-04-04 PROCEDURE — 80061 LIPID PANEL: CPT | Mod: ORL | Performed by: FAMILY MEDICINE

## 2024-05-05 ENCOUNTER — HEALTH MAINTENANCE LETTER (OUTPATIENT)
Age: 64
End: 2024-05-05

## 2024-07-31 ENCOUNTER — OFFICE VISIT (OUTPATIENT)
Dept: PULMONOLOGY | Facility: CLINIC | Age: 64
End: 2024-07-31
Attending: INTERNAL MEDICINE
Payer: MEDICARE

## 2024-07-31 VITALS
HEART RATE: 68 BPM | BODY MASS INDEX: 33.56 KG/M2 | WEIGHT: 224 LBS | OXYGEN SATURATION: 95 % | SYSTOLIC BLOOD PRESSURE: 111 MMHG | DIASTOLIC BLOOD PRESSURE: 68 MMHG

## 2024-07-31 DIAGNOSIS — J44.9 COPD, GROUP B, BY GOLD 2017 CLASSIFICATION (H): Primary | ICD-10-CM

## 2024-07-31 PROCEDURE — G2211 COMPLEX E/M VISIT ADD ON: HCPCS | Performed by: INTERNAL MEDICINE

## 2024-07-31 PROCEDURE — 99213 OFFICE O/P EST LOW 20 MIN: CPT | Performed by: INTERNAL MEDICINE

## 2024-07-31 RX ORDER — HYDROCORTISONE 2.5 %
CREAM (GRAM) TOPICAL
COMMUNITY
Start: 2024-03-28

## 2024-07-31 RX ORDER — LEVOTHYROXINE SODIUM 100 MCG
TABLET ORAL
COMMUNITY
Start: 2024-05-31

## 2024-07-31 RX ORDER — TRIAMCINOLONE ACETONIDE 1 MG/G
CREAM TOPICAL
COMMUNITY
Start: 2024-03-28

## 2024-07-31 RX ORDER — KETOCONAZOLE 20 MG/G
CREAM TOPICAL
COMMUNITY
Start: 2024-03-28
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
## 2024-09-06 ENCOUNTER — ANCILLARY PROCEDURE (OUTPATIENT)
Dept: MAMMOGRAPHY | Facility: HOSPITAL | Age: 64
End: 2024-09-06
Attending: FAMILY MEDICINE
Payer: MEDICARE

## 2024-09-06 DIAGNOSIS — Z12.31 VISIT FOR SCREENING MAMMOGRAM: ICD-10-CM

## 2024-09-06 PROCEDURE — 77063 BREAST TOMOSYNTHESIS BI: CPT

## 2024-10-10 ENCOUNTER — LAB REQUISITION (OUTPATIENT)
Dept: LAB | Facility: CLINIC | Age: 64
End: 2024-10-10
Payer: MEDICARE

## 2024-10-10 DIAGNOSIS — E03.9 HYPOTHYROIDISM, UNSPECIFIED: ICD-10-CM

## 2024-10-10 DIAGNOSIS — R25.2 CRAMP AND SPASM: ICD-10-CM

## 2024-10-10 PROCEDURE — 84439 ASSAY OF FREE THYROXINE: CPT | Mod: ORL | Performed by: FAMILY MEDICINE

## 2024-10-10 PROCEDURE — 84443 ASSAY THYROID STIM HORMONE: CPT | Mod: ORL | Performed by: FAMILY MEDICINE

## 2024-10-10 PROCEDURE — 80048 BASIC METABOLIC PNL TOTAL CA: CPT | Mod: ORL | Performed by: FAMILY MEDICINE

## 2024-10-10 PROCEDURE — 83735 ASSAY OF MAGNESIUM: CPT | Mod: ORL | Performed by: FAMILY MEDICINE

## 2024-10-11 LAB
ANION GAP SERPL CALCULATED.3IONS-SCNC: 14 MMOL/L (ref 7–15)
BUN SERPL-MCNC: 16.7 MG/DL (ref 8–23)
CALCIUM SERPL-MCNC: 9.3 MG/DL (ref 8.8–10.4)
CHLORIDE SERPL-SCNC: 98 MMOL/L (ref 98–107)
CREAT SERPL-MCNC: 1.02 MG/DL (ref 0.51–0.95)
EGFRCR SERPLBLD CKD-EPI 2021: 60 ML/MIN/1.73M2
GLUCOSE SERPL-MCNC: 91 MG/DL (ref 70–99)
HCO3 SERPL-SCNC: 24 MMOL/L (ref 22–29)
MAGNESIUM SERPL-MCNC: 1.9 MG/DL (ref 1.7–2.3)
POTASSIUM SERPL-SCNC: 5 MMOL/L (ref 3.4–5.3)
SODIUM SERPL-SCNC: 136 MMOL/L (ref 135–145)
T4 FREE SERPL-MCNC: 1.39 NG/DL (ref 0.9–1.7)
TSH SERPL DL<=0.005 MIU/L-ACNC: 1.68 UIU/ML (ref 0.3–4.2)

## 2024-12-17 ENCOUNTER — DOCUMENTATION ONLY (OUTPATIENT)
Dept: PULMONOLOGY | Facility: CLINIC | Age: 64
End: 2024-12-17
Payer: MEDICARE

## 2024-12-17 DIAGNOSIS — J96.21 ACUTE ON CHRONIC RESPIRATORY FAILURE WITH HYPOXEMIA (H): ICD-10-CM

## 2024-12-17 DIAGNOSIS — J44.9 CHRONIC OBSTRUCTIVE PULMONARY DISEASE, UNSPECIFIED (H): Primary | ICD-10-CM

## 2024-12-27 ENCOUNTER — TRANSFERRED RECORDS (OUTPATIENT)
Dept: HEALTH INFORMATION MANAGEMENT | Facility: CLINIC | Age: 64
End: 2024-12-27

## 2024-12-27 ENCOUNTER — LAB REQUISITION (OUTPATIENT)
Dept: LAB | Facility: CLINIC | Age: 64
End: 2024-12-27
Payer: MEDICARE

## 2024-12-27 DIAGNOSIS — I10 ESSENTIAL (PRIMARY) HYPERTENSION: ICD-10-CM

## 2024-12-27 LAB
ALBUMIN SERPL BCG-MCNC: 4.3 G/DL (ref 3.5–5.2)
ALP SERPL-CCNC: 99 U/L (ref 40–150)
ALT SERPL W P-5'-P-CCNC: 32 U/L (ref 0–50)
ANION GAP SERPL CALCULATED.3IONS-SCNC: 12 MMOL/L (ref 7–15)
AST SERPL W P-5'-P-CCNC: 30 U/L (ref 0–45)
BILIRUB SERPL-MCNC: 0.8 MG/DL
BUN SERPL-MCNC: 14 MG/DL (ref 8–23)
CALCIUM SERPL-MCNC: 9.4 MG/DL (ref 8.8–10.4)
CHLORIDE SERPL-SCNC: 96 MMOL/L (ref 98–107)
CREAT SERPL-MCNC: 1.02 MG/DL (ref 0.51–0.95)
EGFRCR SERPLBLD CKD-EPI 2021: 60 ML/MIN/1.73M2
GLUCOSE SERPL-MCNC: 101 MG/DL (ref 70–99)
HCO3 SERPL-SCNC: 27 MMOL/L (ref 22–29)
POTASSIUM SERPL-SCNC: 4.4 MMOL/L (ref 3.4–5.3)
PROT SERPL-MCNC: 7.2 G/DL (ref 6.4–8.3)
SODIUM SERPL-SCNC: 135 MMOL/L (ref 135–145)

## 2024-12-27 PROCEDURE — 80053 COMPREHEN METABOLIC PANEL: CPT | Mod: ORL | Performed by: FAMILY MEDICINE

## 2025-01-05 DIAGNOSIS — E78.5 HYPERLIPIDEMIA, UNSPECIFIED HYPERLIPIDEMIA TYPE: ICD-10-CM

## 2025-01-06 RX ORDER — ROSUVASTATIN CALCIUM 40 MG/1
TABLET, COATED ORAL
Qty: 90 TABLET | Refills: 0 | Status: SHIPPED | OUTPATIENT
Start: 2025-01-06

## 2025-01-13 DIAGNOSIS — I10 ESSENTIAL HYPERTENSION: ICD-10-CM

## 2025-01-14 RX ORDER — LOSARTAN POTASSIUM 25 MG/1
25 TABLET ORAL DAILY
Qty: 90 TABLET | Refills: 0 | Status: SHIPPED | OUTPATIENT
Start: 2025-01-14

## 2025-03-04 ENCOUNTER — TRANSFERRED RECORDS (OUTPATIENT)
Dept: HEALTH INFORMATION MANAGEMENT | Facility: CLINIC | Age: 65
End: 2025-03-04
Payer: MEDICARE

## 2025-03-24 ENCOUNTER — OFFICE VISIT (OUTPATIENT)
Dept: CARDIOLOGY | Facility: CLINIC | Age: 65
End: 2025-03-24
Payer: MEDICARE

## 2025-03-24 VITALS
RESPIRATION RATE: 20 BRPM | WEIGHT: 221.7 LBS | HEART RATE: 78 BPM | SYSTOLIC BLOOD PRESSURE: 128 MMHG | BODY MASS INDEX: 33.22 KG/M2 | DIASTOLIC BLOOD PRESSURE: 64 MMHG | OXYGEN SATURATION: 95 %

## 2025-03-24 DIAGNOSIS — I10 ESSENTIAL HYPERTENSION: ICD-10-CM

## 2025-03-24 DIAGNOSIS — R06.09 DYSPNEA ON EXERTION: ICD-10-CM

## 2025-03-24 DIAGNOSIS — J44.9 CHRONIC OBSTRUCTIVE PULMONARY DISEASE, UNSPECIFIED COPD TYPE (H): ICD-10-CM

## 2025-03-24 DIAGNOSIS — E78.5 HYPERLIPIDEMIA, UNSPECIFIED HYPERLIPIDEMIA TYPE: ICD-10-CM

## 2025-03-24 DIAGNOSIS — Z87.891 FORMER SMOKER: ICD-10-CM

## 2025-03-24 DIAGNOSIS — I25.10 NONOBSTRUCTIVE ATHEROSCLEROSIS OF CORONARY ARTERY: Primary | ICD-10-CM

## 2025-03-24 PROCEDURE — 3078F DIAST BP <80 MM HG: CPT | Performed by: GENERAL ACUTE CARE HOSPITAL

## 2025-03-24 PROCEDURE — 3074F SYST BP LT 130 MM HG: CPT | Performed by: GENERAL ACUTE CARE HOSPITAL

## 2025-03-24 PROCEDURE — 99214 OFFICE O/P EST MOD 30 MIN: CPT | Performed by: GENERAL ACUTE CARE HOSPITAL

## 2025-03-24 PROCEDURE — G2211 COMPLEX E/M VISIT ADD ON: HCPCS | Performed by: GENERAL ACUTE CARE HOSPITAL

## 2025-03-24 RX ORDER — LACTOBACILLUS RHAMNOSUS GG 10B CELL
1 CAPSULE ORAL DAILY
COMMUNITY

## 2025-03-24 RX ORDER — MENTHOL 40 MG/G
CREAM TOPICAL
COMMUNITY

## 2025-03-24 RX ORDER — MOMETASONE FUROATE 1 MG/ML
SOLUTION TOPICAL DAILY
COMMUNITY
Start: 2024-10-11

## 2025-03-24 RX ORDER — HYDROCHLOROTHIAZIDE 25 MG/1
25 TABLET ORAL DAILY
Qty: 90 TABLET | Refills: 3 | Status: SHIPPED | OUTPATIENT
Start: 2025-03-24

## 2025-03-24 NOTE — PROGRESS NOTES
Pt has a lot of stressful situations - health issues with oldest son, and 's throat and bladder cancer.  Pt is on antidepressant.  Thanks    MARI Gilliam

## 2025-03-24 NOTE — PROGRESS NOTES
HEART CARE ENCOUNTER NOTE        Assessment/Recommendations   Assessment:    Nonobstructive coronary artery disease seen on coronary angiography 1/6/2021. Her symptoms are not felt to be anginal.  Dyspnea on exertion and chest tightness likely primarily due to chronic obstructive pulmonary disease.  Essential hypertension. Controlled.  Hyperlipidemia. Last LDL 68 mg/dL.  Severe chronic obstructive pulmonary disease noted on pulmonary function testing 1/19/2024.  Former smoker.  Obesity with body mass index 33.22 kg/m .    Plan:  Continue current anti-hypertensive medications.  Rosuvastatin 40 mg daily.  Aspirin 81 mg daily.  Follow-up with me in 1 year.       The longitudinal plan of care for the diagnosis(es)/condition(s) as documented were addressed during this visit. Due to the added complexity in care, I will continue to support Dayana in the subsequent management and with ongoing continuity of care.    History of Present Illness   Ms. Dayana Valverde is a 64 year old female with a significant past history of nonobstructive CAD, COPD and former smoker presenting for follow-up.     She last saw Karly Perez in cardiology clinic 1/9/2024. She was noting worsening chest pain and dyspnea with exertion at that time. She had a pharmacologic nuclear stress test done 1/11/2024 which showed no ischemia. PFTs were then performed 1/19/2024 which showed severe airway obstruction.    She feels her breathing has not gotten much better but she is trying to exercise and stay active. Sometimes when she is very short of breath her chest tightens as well. Recently she was prescribed doxycycline for a COPD exacerbation which she felt made her chest hurt more.    No light headedness/dizziness, pre-syncope, syncope, lower extremity swelling, palpitations, paroxysmal nocturnal dyspnea (PND), or orthopnea.    She initially saw me 12/14/2020 for exertional dyspnea and chest discomfort, with a nuclear stress test suggesting ischemia. She  was started on rosuvastatin and metoprolol and went for coronary angiography on 1/6/2021. A 60% mid RCA stenosis was found, but negative by FFR at 0.87. As she noted her symptoms seemed worse on metoprolol, this was switched to isosorbide mononitrate due to concern for coronary vasospasm but she felt worse with this. TTE was normal      Cardiac Problems and Cardiac Diagnostics     Most Recent Cardiac testing:  ECG dated 1/6/2021 (personaly reviewed and interpreted): sinus bradycardia, otherwise normal ECG     Pulmonary function testing 1/19/2024 (results reviewed):  FEV1/FVC 42, reduced.   FEV1 0.93L, 37% predicted, reduced   FVC 2.20L, 69% predicted, reduced   There is significant improvement post-bronchodilator.   Flow volume loop shows obstruction   TLC 6.86L, 122% predicted, increased   RV 4.13L, 189% predicted, increased   DLCO 47% predicted, reduced     Impression: Severe obstruction with reversibility. Air trapping and hyperinflation present. Moderate reduction in diffusion capacity.     ECHO 1/6/2021 (report reviewed):  1. Normal left ventricular size and systolic performance with a visually estimated ejection fraction of 60%.   2. No significant valvular heart disease is identified on this study.   3. Normal right ventricular size and systolic performance.      Stress test 1/11/2024 (report reviewed):     1.Negative pharmacological regadenoson ECG for ischemia.    2.The nuclear stress test is negative for inducible myocardial ischemia or infarction.    3.The left ventricular ejection fraction at stress is 72%.    A prior study was conducted on 12/10/2020.  This study has no change when compared with the prior study.     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  Centrilobular emphysema. No acute infiltrate, pneumothorax, pulmonary nodule or mediastinal lymphadenopathy. 2.No pleural or pericardial effusion.     Medications  Allergies   Current Outpatient Medications   Medication Sig Dispense Refill    albuterol (PROVENTIL) (5 MG/ML) 0.5% neb solution Inhale 2.5 mg into the lungs as needed      aspirin (ASA) 81 MG chewable tablet Take 81 mg by mouth daily      budesonide-formoterol (SYMBICORT) 160-4.5 MCG/ACT Inhaler Inhale 2 puffs into the lungs 2 times daily      Cholecalciferol (VITAMIN D-3) 125 MCG (5000 UT) TABS Take 5,000 mg by mouth daily      FLUoxetine (PROZAC) 40 MG capsule Take 40 mg by mouth daily      hydrochlorothiazide (HYDRODIURIL) 25 MG tablet Take 1 tablet (25 mg) by mouth daily 90 tablet 0    hydrocortisone 2.5 % cream       ketoconazole (NIZORAL) 2 % external cream       lactobacillus rhamnosus, GG, (CULTURELL) capsule Take 1 capsule by mouth daily. 20,000 units      losartan (COZAAR) 25 MG tablet Take 1 tablet (25 mg) by mouth daily. 90 tablet 0    meloxicam (MOBIC) 15 MG tablet Take 15 mg by mouth daily      Menthol, Topical Analgesic, 4 % CREA Apply topically once as directed.      mometasone (ELOCON) 0.1 % external solution Apply topically daily.      omeprazole (PRILOSEC) 40 MG DR capsule Take 40 mg by mouth daily      potassium 99 MG TABS Take 1 tablet by mouth daily. ? Take 6 a day      rosuvastatin (CRESTOR) 40 MG tablet TAKE ONE TABLET BY MOUTH ONCE DAILY AT BEDTIME 90 tablet 0    SYNTHROID 100 MCG tablet       tiotropium (SPIRIVA RESPIMAT) 2.5 MCG/ACT inhaler Inhale 2 puffs into the lungs daily      triamcinolone (KENALOG) 0.1 % external cream APPLY A THIN LAYER TOPICALLY TO LEGS AS DIRECTED FOR 14 DAYS THEN AS NEEDED FOR FLARES        Allergies   Allergen Reactions    Sodium Pentobarbital [Pentobarbital] Other (See Comments)     Sodium Pentothol  Patient does not wake up from this medication    Naproxen      Other:  GI Upset          Physical Examination Review of Systems   /64 (BP Location: Left arm, Patient Position: Sitting, Cuff Size: Adult Regular)   Pulse 78   Resp 20   Wt 100.6 kg (221 lb 11.2 oz)   LMP 11/30/1997   SpO2 95%   BMI 33.22 kg/m    Body mass index is 33.22 kg/m .  Wt Readings from Last 3 Encounters:   03/24/25 100.6 kg (221 lb 11.2 oz)   07/31/24 101.6 kg (224 lb)   01/19/24 102.5 kg (226 lb)       General Appearance:   Pleasant  female, appears  stated age. no acute distress, obese body habitus   ENT/Mouth: membranes moist, no apparent gingival bleeding.      EYES:  no scleral icterus, normal conjunctivae   Neck: no carotid bruits. No anterior cervical lymphadenopaty   Respiratory:   Diminished breath sounds, no rales or wheezing, equal chest wall expansion    Cardiovascular:   Regular rhythm, normal rate. Normal first and second heart sounds with no murmurs, rubs, or gallops; the carotid, radial and posterior tibial pulses are intact, Jugular venous pressure normal, no lower extremity edema bilaterally    Abdomen/GI:  no organomegaly, masses, bruits, or tenderness; bowel sounds are present   Extremities: no cyanosis or clubbing   Skin: no xanthelasma, warm.    Heme/lymph/ Immunology No apparent bleeding noted.   Neurologic: Alert and oriented. normal gait, no tremors     Psychiatric: Pleasant, calm, appropriate affect.    A complete 10 system review of systems was performed and is negative except as mentioned in the HPI/subjective.         Past History   Past Medical History:   Past Medical History:   Diagnosis Date    Cancer (H)     Melanoma    Complication of anesthesia     COPD (chronic obstructive pulmonary disease) (H)     per chart    Dyspnea on exertion     GERD (gastroesophageal reflux disease)     Hyperlipidemia     Hypothyroidism     PONV (postoperative nausea and vomiting)        Past Surgical History:   Past Surgical History:   Procedure Laterality Date    BIOPSY NODE SENTINEL Right  2019    Procedure: [place] lymph node biopsy;  Surgeon: Tramaine Garland MD;  [facility]: [facility]    CV CORONARY ANGIOGRAM N/A 2021    Procedure: Coronary Angiogram;  Surgeon: Nimesh Hawley MD;  Location: [facility] Cardiac Cath Lab;  Service: Cardiology    CV LEFT HEART CATHETERIZATION WITHOUT LEFT VENTRICULOGRAM Left 2021    Procedure: Left Heart Catheterization Without Left Ventriculogram;  Surgeon: Nimesh Hawley MD;  Location: [facility] Cardiac Cath Lab;  Service: Cardiology    EXCISE LESION BACK Right 2019    Procedure: Wide local excision of right back melanoma;  Surgeon: Tramaine Garland MD;  Location: [facility] OR    HYSTERECTOMY      TOTAL HIP ARTHROPLASTY Right 2017       Family History:   Family History   Problem Relation Age of Onset    Rheumatoid Arthritis Other     Osteoarthritis Mother     Hypertension Mother     Goiter Mother     Arthritis Other     Cancer Other     Leukemia Other     Cerebrovascular Disease Father     Heart Disease Father     Diabetes Other        Social History:   Social History     Socioeconomic History    Marital status:      Spouse name: Not on file    Number of children: Not on file    Years of education: Not on file    Highest education level: Not on file   Occupational History    Not on file   Tobacco Use    Smoking status: Former     Current packs/day: 0.00     Average packs/day: 2.0 packs/day for 30.0 years (60.0 ttl pk-yrs)     Types: Cigarettes     Start date: 1978     Quit date: 2008     Years since quittin.2     Passive exposure: Past (Dad was a smoker until patient was age 10)    Smokeless tobacco: Never   Vaping Use    Vaping status: Never Used   Substance and Sexual Activity    Alcohol use: No    Drug use: Not on file    Sexual activity: Not on file   Other Topics Concern    Not on file   Social History Narrative    Not on file     Social Drivers of Health     Financial Resource Strain: Not on file   Food Insecurity: Not  on file   Transportation Needs: Not on file   Physical Activity: Not on file   Stress: Not on file   Social Connections: Not on file   Interpersonal Safety: Not on file   Housing Stability: Not on file              Lab Results    Chemistry/lipid CBC Cardiac Enzymes/BNP/TSH/INR   Lab Results   Component Value Date    CHOL 144 04/04/2024    HDL 53 04/04/2024    LDL 68 04/04/2024    TRIG 116 04/04/2024    CR 1.02 (H) 12/27/2024    BUN 14.0 12/27/2024    POTASSIUM 4.4 12/27/2024     12/27/2024    CO2 27 12/27/2024      Lab Results   Component Value Date    WBC 7.7 01/06/2021    HGB 14.0 01/19/2024    HCT 39.8 01/06/2021    MCV 92 01/06/2021     01/06/2021      Lab Results   Component Value Date    TSH 1.68 10/10/2024          Joe Roca MD [facility]  Non-Invasive Cardiologist  [facility]  Pager [phone]

## 2025-03-24 NOTE — LETTER
3/24/2025    Barbie Silver MD  [address] [place] [address]    RE: Dayana Valverde       Dear Colleague,     I had the pleasure of seeing Dayana Valverde in the [facility].  Pt has a lot of stressful situations - health issues with oldest son, and 's throat and bladder cancer.  Pt is on antidepressant.  Thanks    MARI Gilliam    Attestation signed by Joe Roca MD at 3/24/2025  8:51 AM:  Noted    HEART CARE ENCOUNTER NOTE        Assessment/Recommendations   Assessment:    Nonobstructive coronary artery disease seen on coronary angiography 1/6/2021. Her symptoms are not felt to be anginal.  Dyspnea on exertion and chest tightness likely primarily due to chronic obstructive pulmonary disease.  Essential hypertension. Controlled.  Hyperlipidemia. Last LDL 68 mg/dL.  Severe chronic obstructive pulmonary disease noted on pulmonary function testing 1/19/2024.  Former smoker.  Obesity with body mass index 33.22 kg/m .    Plan:  Continue current anti-hypertensive medications.  Rosuvastatin 40 mg daily.  Aspirin 81 mg daily.  Follow-up with me in 1 year.       The longitudinal plan of care for the diagnosis(es)/condition(s) as documented were addressed during this visit. Due to the added complexity in care, I will continue to support Dayana in the subsequent management and with ongoing continuity of care.    History of Present Illness   Ms. Dayana Valverde is a 64 year old female with a significant past history of nonobstructive CAD, COPD and former smoker presenting for follow-up.     She last saw Karly Perez in cardiology clinic 1/9/2024. She was noting worsening chest pain and dyspnea with exertion at that time. She had a pharmacologic nuclear stress test done 1/11/2024 which showed no ischemia. PFTs were then performed 1/19/2024 which showed severe airway obstruction.    She feels her breathing has not gotten much better but she is trying to exercise and stay active. Sometimes when  she is very short of breath her chest tightens as well. Recently she was prescribed doxycycline for a COPD exacerbation which she felt made her chest hurt more.    No light headedness/dizziness, pre-syncope, syncope, lower extremity swelling, palpitations, paroxysmal nocturnal dyspnea (PND), or orthopnea.    She initially saw me 12/14/2020 for exertional dyspnea and chest discomfort, with a nuclear stress test suggesting ischemia. She was started on rosuvastatin and metoprolol and went for coronary angiography on 1/6/2021. A 60% mid RCA stenosis was found, but negative by FFR at 0.87. As she noted her symptoms seemed worse on metoprolol, this was switched to isosorbide mononitrate due to concern for coronary vasospasm but she felt worse with this. TTE was normal      Cardiac Problems and Cardiac Diagnostics     Most Recent Cardiac testing:  ECG dated 1/6/2021 (personaly reviewed and interpreted): sinus bradycardia, otherwise normal ECG     Pulmonary function testing 1/19/2024 (results reviewed):  FEV1/FVC 42, reduced.   FEV1 0.93L, 37% predicted, reduced   FVC 2.20L, 69% predicted, reduced   There is significant improvement post-bronchodilator.   Flow volume loop shows obstruction   TLC 6.86L, 122% predicted, increased   RV 4.13L, 189% predicted, increased   DLCO 47% predicted, reduced     Impression: Severe obstruction with reversibility. Air trapping and hyperinflation present. Moderate reduction in diffusion capacity.     ECHO 1/6/2021 (report reviewed):  1. Normal left ventricular size and systolic performance with a visually estimated ejection fraction of 60%.   2. No significant valvular heart disease is identified on this study.   3. Normal right ventricular size and systolic performance.      Stress test 1/11/2024 (report reviewed):      1.Negative pharmacological regadenoson ECG for ischemia.     2.The nuclear stress test is negative for inducible myocardial ischemia or infarction.     3.The left  ventricular ejection fraction at stress is 72%.     A prior study was conducted on 12/10/2020.  This study has no change when compared with the prior study.     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  Centrilobular emphysema. No acute infiltrate, pneumothorax, pulmonary nodule or mediastinal lymphadenopathy. 2.No pleural or pericardial effusion.     Medications  Allergies   Current Outpatient Medications   Medication Sig Dispense Refill     albuterol (PROVENTIL) (5 MG/ML) 0.5% neb solution Inhale 2.5 mg into the lungs as needed       aspirin (ASA) 81 MG chewable tablet Take 81 mg by mouth daily       budesonide-formoterol (SYMBICORT) 160-4.5 MCG/ACT Inhaler Inhale 2 puffs into the lungs 2 times daily       Cholecalciferol (VITAMIN D-3) 125 MCG (5000 UT) TABS Take 5,000 mg by mouth daily       FLUoxetine (PROZAC) 40 MG capsule Take 40 mg by mouth daily       hydrochlorothiazide (HYDRODIURIL) 25 MG tablet Take 1 tablet (25 mg) by mouth daily 90 tablet 0     hydrocortisone 2.5 % cream        ketoconazole (NIZORAL) 2 % external cream        lactobacillus rhamnosus, GG, (CULTURELL) capsule Take 1 capsule by mouth daily. 20,000 units       losartan (COZAAR) 25 MG tablet Take 1 tablet (25 mg) by mouth daily. 90 tablet 0     meloxicam (MOBIC) 15 MG tablet Take 15 mg by mouth daily       Menthol, Topical Analgesic, 4 % CREA Apply topically once as directed.       mometasone (ELOCON) 0.1 % external solution Apply topically daily.       omeprazole (PRILOSEC) 40 MG DR capsule Take 40 mg by mouth daily       potassium 99 MG TABS Take 1 tablet by mouth daily. ? Take 6 a day       rosuvastatin (CRESTOR) 40 MG tablet TAKE ONE TABLET BY MOUTH ONCE DAILY AT BEDTIME 90 tablet 0      SYNTHROID 100 MCG tablet        tiotropium (SPIRIVA RESPIMAT) 2.5 MCG/ACT inhaler Inhale 2 puffs into the lungs daily       triamcinolone (KENALOG) 0.1 % external cream APPLY A THIN LAYER TOPICALLY TO LEGS AS DIRECTED FOR 14 DAYS THEN AS NEEDED FOR FLARES        Allergies   Allergen Reactions     Sodium Pentobarbital [Pentobarbital] Other (See Comments)     Sodium Pentothol  Patient does not wake up from this medication     Naproxen      Other: GI Upset          Physical Examination Review of Systems   /64 (BP Location: Left arm, Patient Position: Sitting, Cuff Size: Adult Regular)   Pulse 78   Resp 20   Wt 100.6 kg (221 lb 11.2 oz)   LMP 11/30/1997   SpO2 95%   BMI 33.22 kg/m    Body mass index is 33.22 kg/m .  Wt Readings from Last 3 Encounters:   03/24/25 100.6 kg (221 lb 11.2 oz)   07/31/24 101.6 kg (224 lb)   01/19/24 102.5 kg (226 lb)       General Appearance:   Pleasant  female, appears  stated age. no acute distress, obese body habitus   ENT/Mouth: membranes moist, no apparent gingival bleeding.      EYES:  no scleral icterus, normal conjunctivae   Neck: no carotid bruits. No anterior cervical lymphadenopaty   Respiratory:   Diminished breath sounds, no rales or wheezing, equal chest wall expansion    Cardiovascular:   Regular rhythm, normal rate. Normal first and second heart sounds with no murmurs, rubs, or gallops; the carotid, radial and posterior tibial pulses are intact, Jugular venous pressure normal, no lower extremity edema bilaterally    Abdomen/GI:  no organomegaly, masses, bruits, or tenderness; bowel sounds are present   Extremities: no cyanosis or clubbing   Skin: no xanthelasma, warm.    Heme/lymph/ Immunology No apparent bleeding noted.   Neurologic: Alert and oriented. normal gait, no tremors     Psychiatric: Pleasant, calm, appropriate affect.    A complete 10 system review of systems was performed and is negative except as mentioned in the HPI/subjective.         Past History    Past Medical History:   Past Medical History:   Diagnosis Date     Cancer (H)     Melanoma     Complication of anesthesia      COPD (chronic obstructive pulmonary disease) (H)     per chart     Dyspnea on exertion      GERD (gastroesophageal reflux disease)      Hyperlipidemia      Hypothyroidism      PONV (postoperative nausea and vomiting)        Past Surgical History:   Past Surgical History:   Procedure Laterality Date     BIOPSY NODE SENTINEL Right 12/31/2019    Procedure: [place] lymph node biopsy;  Surgeon: Tramaine Garland MD;  [facility]     CV CORONARY ANGIOGRAM N/A 1/6/2021    Procedure: Coronary Angiogram;  Surgeon: Nimesh Hawley MD;  Location: [facility] Cardiac Cath Lab;  Service: Cardiology     CV LEFT HEART CATHETERIZATION WITHOUT LEFT VENTRICULOGRAM Left 1/6/2021    Procedure: Left Heart Catheterization Without Left Ventriculogram;  Surgeon: Nimesh Hawley MD;  Location: [facility] Cardiac Cath Lab;  Service: Cardiology     EXCISE LESION BACK Right 12/31/2019    Procedure: Wide local excision of right back melanoma;  Surgeon: Tramaine Garland MD;  Location: [facility] OR     HYSTERECTOMY       TOTAL HIP ARTHROPLASTY Right 2017       Family History:   Family History   Problem Relation Age of Onset     Rheumatoid Arthritis Other      Osteoarthritis Mother      Hypertension Mother      Goiter Mother      Arthritis Other      Cancer Other      Leukemia Other      Cerebrovascular Disease Father      Heart Disease Father      Diabetes Other        Social History:   Social History     Socioeconomic History     Marital status:      Spouse name: Not on file     Number of children: Not on file     Years of education: Not on file     Highest education level: Not on file   Occupational History     Not on file   Tobacco Use     Smoking status: Former     Current packs/day: 0.00     Average packs/day: 2.0 packs/day for 30.0 years (60.0 ttl pk-yrs)     Types: Cigarettes     Start date:  1978     Quit date: 2008     Years since quittin.2     Passive exposure: Past (Dad was a smoker until patient was age 10)     Smokeless tobacco: Never   Vaping Use     Vaping status: Never Used   Substance and Sexual Activity     Alcohol use: No     Drug use: Not on file     Sexual activity: Not on file   Other Topics Concern     Not on file   Social History Narrative     Not on file     Social Drivers of Health     Financial Resource Strain: Not on file   Food Insecurity: Not on file   Transportation Needs: Not on file   Physical Activity: Not on file   Stress: Not on file   Social Connections: Not on file   Interpersonal Safety: Not on file   Housing Stability: Not on file              Lab Results    Chemistry/lipid CBC Cardiac Enzymes/BNP/TSH/INR   Lab Results   Component Value Date    CHOL 144 2024    HDL 53 2024    LDL 68 2024    TRIG 116 2024    CR 1.02 (H) 2024    BUN 14.0 2024    POTASSIUM 4.4 2024     2024    CO2 27 2024      Lab Results   Component Value Date    WBC 7.7 2021    HGB 14.0 2024    HCT 39.8 2021    MCV 92 2021     2021      Lab Results   Component Value Date    TSH 1.68 10/10/2024          Joe Roca MD [facility]  Non-Invasive Cardiologist  [facility]  Pager [phone]      Thank you for allowing me to participate in the care of your patient.      Sincerely,     Joe Roca MD     [facility]  cc:   Joe Roca MD  [address]  [place] [address]

## 2025-04-08 DIAGNOSIS — E78.5 HYPERLIPIDEMIA, UNSPECIFIED HYPERLIPIDEMIA TYPE: ICD-10-CM

## 2025-04-08 RX ORDER — ROSUVASTATIN CALCIUM 40 MG/1
TABLET, COATED ORAL
Qty: 90 TABLET | Refills: 2 | Status: SHIPPED | OUTPATIENT
Start: 2025-04-08

## 2025-04-11 ENCOUNTER — LAB REQUISITION (OUTPATIENT)
Dept: LAB | Facility: CLINIC | Age: 65
End: 2025-04-11
Payer: MEDICARE

## 2025-04-11 DIAGNOSIS — E78.5 HYPERLIPIDEMIA, UNSPECIFIED: ICD-10-CM

## 2025-04-11 DIAGNOSIS — E03.9 HYPOTHYROIDISM, UNSPECIFIED: ICD-10-CM

## 2025-04-11 DIAGNOSIS — K76.0 FATTY (CHANGE OF) LIVER, NOT ELSEWHERE CLASSIFIED: ICD-10-CM

## 2025-04-11 PROCEDURE — 84439 ASSAY OF FREE THYROXINE: CPT | Mod: ORL | Performed by: FAMILY MEDICINE

## 2025-04-11 PROCEDURE — 80053 COMPREHEN METABOLIC PANEL: CPT | Mod: ORL | Performed by: FAMILY MEDICINE

## 2025-04-11 PROCEDURE — 84443 ASSAY THYROID STIM HORMONE: CPT | Mod: ORL | Performed by: FAMILY MEDICINE

## 2025-04-11 PROCEDURE — 80061 LIPID PANEL: CPT | Mod: ORL | Performed by: FAMILY MEDICINE

## 2025-04-12 LAB
ALBUMIN SERPL BCG-MCNC: 4.4 G/DL (ref 3.5–5.2)
ALP SERPL-CCNC: 97 U/L (ref 40–150)
ALT SERPL W P-5'-P-CCNC: 26 U/L (ref 0–50)
ANION GAP SERPL CALCULATED.3IONS-SCNC: 16 MMOL/L (ref 7–15)
AST SERPL W P-5'-P-CCNC: 29 U/L (ref 0–45)
BILIRUB SERPL-MCNC: 0.5 MG/DL
BUN SERPL-MCNC: 12.2 MG/DL (ref 8–23)
CALCIUM SERPL-MCNC: 9.6 MG/DL (ref 8.8–10.4)
CHLORIDE SERPL-SCNC: 96 MMOL/L (ref 98–107)
CHOLEST SERPL-MCNC: 150 MG/DL
CREAT SERPL-MCNC: 0.89 MG/DL (ref 0.51–0.95)
EGFRCR SERPLBLD CKD-EPI 2021: 71 ML/MIN/1.73M2
FASTING STATUS PATIENT QL REPORTED: ABNORMAL
FASTING STATUS PATIENT QL REPORTED: NORMAL
GLUCOSE SERPL-MCNC: 90 MG/DL (ref 70–99)
HCO3 SERPL-SCNC: 26 MMOL/L (ref 22–29)
HDLC SERPL-MCNC: 67 MG/DL
LDLC SERPL CALC-MCNC: 63 MG/DL
NONHDLC SERPL-MCNC: 83 MG/DL
POTASSIUM SERPL-SCNC: 3.8 MMOL/L (ref 3.4–5.3)
PROT SERPL-MCNC: 7.3 G/DL (ref 6.4–8.3)
SODIUM SERPL-SCNC: 138 MMOL/L (ref 135–145)
T4 FREE SERPL-MCNC: 1.51 NG/DL (ref 0.9–1.7)
TRIGL SERPL-MCNC: 98 MG/DL
TSH SERPL DL<=0.005 MIU/L-ACNC: 2.34 UIU/ML (ref 0.3–4.2)

## 2025-04-14 DIAGNOSIS — I10 ESSENTIAL HYPERTENSION: ICD-10-CM

## 2025-04-14 RX ORDER — LOSARTAN POTASSIUM 25 MG/1
25 TABLET ORAL DAILY
Qty: 90 TABLET | Refills: 2 | Status: SHIPPED | OUTPATIENT
Start: 2025-04-14

## 2025-05-17 ENCOUNTER — HEALTH MAINTENANCE LETTER (OUTPATIENT)
Age: 65
End: 2025-05-17

## 2025-07-30 ENCOUNTER — OFFICE VISIT (OUTPATIENT)
Dept: PULMONOLOGY | Facility: CLINIC | Age: 65
End: 2025-07-30
Attending: INTERNAL MEDICINE
Payer: MEDICARE

## 2025-07-30 VITALS
BODY MASS INDEX: 33.08 KG/M2 | HEART RATE: 69 BPM | SYSTOLIC BLOOD PRESSURE: 139 MMHG | OXYGEN SATURATION: 98 % | DIASTOLIC BLOOD PRESSURE: 74 MMHG | WEIGHT: 220.8 LBS

## 2025-07-30 DIAGNOSIS — J44.9 COPD, GROUP B, BY GOLD 2017 CLASSIFICATION (H): Primary | ICD-10-CM

## 2025-07-30 PROCEDURE — G2211 COMPLEX E/M VISIT ADD ON: HCPCS | Performed by: INTERNAL MEDICINE

## 2025-07-30 PROCEDURE — 3075F SYST BP GE 130 - 139MM HG: CPT | Performed by: INTERNAL MEDICINE

## 2025-07-30 PROCEDURE — 3078F DIAST BP <80 MM HG: CPT | Performed by: INTERNAL MEDICINE

## 2025-07-30 PROCEDURE — 99213 OFFICE O/P EST LOW 20 MIN: CPT | Performed by: INTERNAL MEDICINE

## 2025-07-30 RX ORDER — PREDNISONE 20 MG/1
40 TABLET ORAL DAILY
Qty: 120 TABLET | Refills: 0 | Status: SHIPPED | OUTPATIENT
Start: 2025-07-30

## 2025-07-30 RX ORDER — PREDNISONE 20 MG/1
TABLET ORAL
COMMUNITY
Start: 2025-06-05 | End: 2025-07-30

## 2025-07-30 NOTE — PROGRESS NOTES
Pulmonary Clinic Follow-up Visit    Assessment and Plan:   65F with a history of COPD GOLD B, emphysema, GERD, HLD, hypothyroidism, history of melanoma, presents for COPD GOLD B follow up. As noted previously, PFTs showed severe obstruction, positive bronchodilator response, hyperinflation and air trapping and moderate diffusion impairment, consistent with COPD. Doing well on ICS/LABA + LAMA therapy. Lung exam and SpO2 are normal today. He has chronic dyspnea on exertion which is stable    Recommendations:  - continue Symbicort 160-4.5 2 puffs BID. Rinse/gargle/spit after use  - continue Spiriva respimat, 2 puffs once daily  - continue albuterol rescue inhaler as needed  - declines pulmonary rehab referral  - enocuraged her to exercise and remain active as able  - does not qualify for LDCT for lung cancer screening as her quit date was >15 years ago.  - UTD with vaccinations. Should get the flu shot each Fall.    Follow up in 1 year or sooner if needed.    The longitudinal plan of care for the diagnosis(es)/condition(s) as documented were addressed during this visit. Due to the added complexity in care, I will continue to support Dayana in the subsequent management and with ongoing continuity of care.     Hemal Hernandez MD (Avi)  [facility] ([facility])  Clinic [phone]  Fax [phone]    CCx: COPD evaluation    HPI: Interim history: I last saw Dayana on 7/31/2024. Since that time, she reports she's doing fairly well. Using inhalers. Using oxygen as needed.  Last exacerbation was Fall 2024. No recent illnesses. She has chronic DELAROSA which is unchanged. Her  has been having some health issues re: bladder cancer.       ROS:  A 12-system review was obtained and was negative with the exception of the symptoms endorsed in the history of present illness.    PMH:  Past Medical History:   Diagnosis Date    Cancer (H)     Melanoma    Complication of anesthesia     COPD (chronic  obstructive pulmonary disease) (H)     per chart    Dyspnea on exertion     GERD (gastroesophageal reflux disease)     Hyperlipidemia     Hypothyroidism     PONV (postoperative nausea and vomiting)         PSH:  Past Surgical History:   Procedure Laterality Date    BIOPSY NODE SENTINEL Right 12/31/2019    Procedure: [place] lymph node biopsy;  Surgeon: Tramaine Garland MD;  [facility]: [facility]    CV CORONARY ANGIOGRAM N/A 1/6/2021    Procedure: Coronary Angiogram;  Surgeon: Nimesh Hawley MD;  Location: [facility] Cardiac Cath Lab;  Service: Cardiology    CV LEFT HEART CATHETERIZATION WITHOUT LEFT VENTRICULOGRAM Left 1/6/2021    Procedure: Left Heart Catheterization Without Left Ventriculogram;  Surgeon: Nimesh Hawley MD;  Location: [facility] Cardiac Cath Lab;  Service: Cardiology    EXCISE LESION BACK Right 12/31/2019    Procedure: Wide local excision of right back melanoma;  Surgeon: Tramaine Garland MD;  Location: [facility] OR    HYSTERECTOMY      TOTAL HIP ARTHROPLASTY Right 2017       Allergies:  Allergies   Allergen Reactions    Sodium Pentobarbital [Pentobarbital] Other (See Comments)     Sodium Pentothol  Patient does not wake up from this medication    Naproxen      Other: GI Upset         Family HX:  Family History   Problem Relation Age of Onset    Rheumatoid Arthritis Other     Osteoarthritis Mother     Hypertension Mother     Goiter Mother     Arthritis Other     Cancer Other     Leukemia Other     Cerebrovascular Disease Father     Heart Disease Father     Diabetes Other        Social Hx:  Social History     Socioeconomic History    Marital status:      Spouse name: Not on file    Number of children: Not on file    Years of education: Not on file    Highest education level: Not on file   Occupational History    Not on file   Tobacco Use    Smoking status: Former     Current packs/day: 0.00     Average packs/day: 2.0 packs/day for 30.0 years (60.0 ttl pk-yrs)     Types: Cigarettes     Start  date: 1978     Quit date: 2008     Years since quittin.5     Passive exposure: Past (Dad was a smoker until patient was age 10)    Smokeless tobacco: Never   Vaping Use    Vaping status: Never Used   Substance and Sexual Activity    Alcohol use: No    Drug use: Not on file    Sexual activity: Not on file   Other Topics Concern    Not on file   Social History Narrative    Not on file     Social Drivers of Health     Financial Resource Strain: Not on file   Food Insecurity: Not on file   Transportation Needs: Not on file   Physical Activity: Not on file   Stress: Not on file   Social Connections: Not on file   Interpersonal Safety: Not on file   Housing Stability: Not on file       Current Meds:  Current Outpatient Medications   Medication Sig Dispense Refill    albuterol (PROVENTIL) (5 MG/ML) 0.5% neb solution Inhale 2.5 mg into the lungs as needed      aspirin (ASA) 81 MG chewable tablet Take 81 mg by mouth daily      budesonide-formoterol (SYMBICORT) 160-4.5 MCG/ACT Inhaler Inhale 2 puffs into the lungs 2 times daily      Cholecalciferol (VITAMIN D-3) 125 MCG (5000 UT) TABS Take 5,000 mg by mouth daily      FLUoxetine (PROZAC) 40 MG capsule Take 40 mg by mouth daily      hydrochlorothiazide (HYDRODIURIL) 25 MG tablet Take 1 tablet (25 mg) by mouth daily. 90 tablet 3    hydrocortisone 2.5 % cream       ketoconazole (NIZORAL) 2 % external cream       lactobacillus rhamnosus, GG, (CULTURELL) capsule Take 1 capsule by mouth daily. 20,000 units      losartan (COZAAR) 25 MG tablet Take 1 tablet (25 mg) by mouth daily. 90 tablet 2    meloxicam (MOBIC) 15 MG tablet Take 15 mg by mouth daily      Menthol, Topical Analgesic, 4 % CREA Apply topically once as directed.      mometasone (ELOCON) 0.1 % external solution Apply topically daily.      omeprazole (PRILOSEC) 40 MG DR capsule Take 40 mg by mouth daily      potassium 99 MG TABS Take 1 tablet by mouth daily. ? Take 6 a day      predniSONE (DELTASONE) 20 MG  tablet Take 2 tablets (40 mg) by mouth daily. Take 40mg for 5 days for COPD exacerbation 120 tablet 0    rosuvastatin (CRESTOR) 40 MG tablet TAKE ONE TABLET BY MOUTH ONCE DAILY AT BEDTIME. FOLLOW UP APPOINTMENT NEEDED 90 tablet 2    SYNTHROID 100 MCG tablet       tiotropium (SPIRIVA RESPIMAT) 2.5 MCG/ACT inhaler Inhale 2 puffs into the lungs daily      triamcinolone (KENALOG) 0.1 % external cream APPLY A THIN LAYER TOPICALLY TO LEGS AS DIRECTED FOR 14 DAYS THEN AS NEEDED FOR FLARES         Physical Exam:  /74 (BP Location: Right arm, Patient Position: Sitting, Cuff Size: Adult Large)   Pulse 69   Wt 100.2 kg (220 lb 12.8 oz)   LMP 11/30/1997   SpO2 98%   BMI 33.08 kg/m    Gen: awake, alert, oriented, no distress  HEENT: nasal turbinates are unremarkable, no oropharyngeal lesions, no cervical or supraclavicular lymphadenopathy  CV: RRR, no M/G/R  Resp: diminished air entry. No wheezing or rhonchi.   Skin: no apparent rashes  Ext: no cyanosis, clubbing or edema  Neuro: alert, nonfocal    Labs:  Reviewed; unremarkable.     Imaging studies:  Personally reviewed the LDCT from Dec 2022  No suspicious findings. Pulmonary emphysema was seen.    Pulmonary Function Testing  1/19/2024  FEV1 0.93L, 37%  FVC 69%  +13% improvement in FVC with BD  Ratio 0.42  TLC 6.86L, 122%  %  Dlco 47% ewa for hgb  Flow volume curve shows obstruction.

## 2025-07-30 NOTE — LETTER
7/30/2025      Dayana Valverde  [address] [place] [address]      Dear Colleague,    Thank you for referring your patient, Dayana Valverde, to the [facility]. Please see a copy of my visit note below.    Pulmonary Clinic Follow-up Visit    Assessment and Plan:   65F with a history of COPD GOLD B, emphysema, GERD, HLD, hypothyroidism, history of melanoma, presents for COPD GOLD B follow up. As noted previously, PFTs showed severe obstruction, positive bronchodilator response, hyperinflation and air trapping and moderate diffusion impairment, consistent with COPD. Doing well on ICS/LABA + LAMA therapy. Lung exam and SpO2 are normal today. He has chronic dyspnea on exertion which is stable    Recommendations:  - continue Symbicort 160-4.5 2 puffs BID. Rinse/gargle/spit after use  - continue Spiriva respimat, 2 puffs once daily  - continue albuterol rescue inhaler as needed  - declines pulmonary rehab referral  - enocuraged her to exercise and remain active as able  - does not qualify for LDCT for lung cancer screening as her quit date was >15 years ago.  - UTD with vaccinations. Should get the flu shot each Fall.    Follow up in 1 year or sooner if needed.    The longitudinal plan of care for the diagnosis(es)/condition(s) as documented were addressed during this visit. Due to the added complexity in care, I will continue to support Dayana in the subsequent management and with ongoing continuity of care.     Hemal Hernandez MD (Avi)  [facility] ([facility])  Clinic [phone]  Fax [phone]    CCx: COPD evaluation    HPI: Interim history: I last saw Dayana on 7/31/2024. Since that time, she reports she's doing fairly well. Using inhalers. Using oxygen as needed.  Last exacerbation was Fall 2024. No recent illnesses. She has chronic DELAROSA which is unchanged. Her  has been having some health issues re: bladder cancer.       ROS:  A  12-system review was obtained and was negative with the exception of the symptoms endorsed in the history of present illness.    PMH:  Past Medical History:   Diagnosis Date     Cancer (H)     Melanoma     Complication of anesthesia      COPD (chronic obstructive pulmonary disease) (H)     per chart     Dyspnea on exertion      GERD (gastroesophageal reflux disease)      Hyperlipidemia      Hypothyroidism      PONV (postoperative nausea and vomiting)         PSH:  Past Surgical History:   Procedure Laterality Date     BIOPSY NODE SENTINEL Right 12/31/2019    Procedure: [place] lymph node biopsy;  Surgeon: Tramaine Garland MD;  [facility]     CV CORONARY ANGIOGRAM N/A 1/6/2021    Procedure: Coronary Angiogram;  Surgeon: Nimesh Hawley MD;  Location: [facility] Cardiac Cath Lab;  Service: Cardiology     CV LEFT HEART CATHETERIZATION WITHOUT LEFT VENTRICULOGRAM Left 1/6/2021    Procedure: Left Heart Catheterization Without Left Ventriculogram;  Surgeon: Nimesh Hawley MD;  Location: [facility] Cardiac Cath Lab;  Service: Cardiology     EXCISE LESION BACK Right 12/31/2019    Procedure: Wide local excision of right back melanoma;  Surgeon: Tramaine Garland MD;  [facility]     HYSTERECTOMY       TOTAL HIP ARTHROPLASTY Right 2017       Allergies:  Allergies   Allergen Reactions     Sodium Pentobarbital [Pentobarbital] Other (See Comments)     Sodium Pentothol  Patient does not wake up from this medication     Naproxen      Other: GI Upset         Family HX:  Family History   Problem Relation Age of Onset     Rheumatoid Arthritis Other      Osteoarthritis Mother      Hypertension Mother      Goiter Mother      Arthritis Other      Cancer Other      Leukemia Other      Cerebrovascular Disease Father      Heart Disease Father      Diabetes Other        Social Hx:  Social History     Socioeconomic History     Marital status:      Spouse name: Not on file     Number of children: Not on file      Years of education: Not on file     Highest education level: Not on file   Occupational History     Not on file   Tobacco Use     Smoking status: Former     Current packs/day: 0.00     Average packs/day: 2.0 packs/day for 30.0 years (60.0 ttl pk-yrs)     Types: Cigarettes     Start date: 1978     Quit date: 2008     Years since quittin.5     Passive exposure: Past (Dad was a smoker until patient was age 10)     Smokeless tobacco: Never   Vaping Use     Vaping status: Never Used   Substance and Sexual Activity     Alcohol use: No     Drug use: Not on file     Sexual activity: Not on file   Other Topics Concern     Not on file   Social History Narrative     Not on file     Social Drivers of Health     Financial Resource Strain: Not on file   Food Insecurity: Not on file   Transportation Needs: Not on file   Physical Activity: Not on file   Stress: Not on file   Social Connections: Not on file   Interpersonal Safety: Not on file   Housing Stability: Not on file       Current Meds:  Current Outpatient Medications   Medication Sig Dispense Refill     albuterol (PROVENTIL) (5 MG/ML) 0.5% neb solution Inhale 2.5 mg into the lungs as needed       aspirin (ASA) 81 MG chewable tablet Take 81 mg by mouth daily       budesonide-formoterol (SYMBICORT) 160-4.5 MCG/ACT Inhaler Inhale 2 puffs into the lungs 2 times daily       Cholecalciferol (VITAMIN D-3) 125 MCG (5000 UT) TABS Take 5,000 mg by mouth daily       FLUoxetine (PROZAC) 40 MG capsule Take 40 mg by mouth daily       hydrochlorothiazide (HYDRODIURIL) 25 MG tablet Take 1 tablet (25 mg) by mouth daily. 90 tablet 3     hydrocortisone 2.5 % cream        ketoconazole (NIZORAL) 2 % external cream        lactobacillus rhamnosus, GG, (CULTURELL) capsule Take 1 capsule by mouth daily. 20,000 units       losartan (COZAAR) 25 MG tablet Take 1 tablet (25 mg) by mouth daily. 90 tablet 2     meloxicam (MOBIC) 15 MG tablet Take 15 mg by mouth daily       Menthol, Topical  Analgesic, 4 % CREA Apply topically once as directed.       mometasone (ELOCON) 0.1 % external solution Apply topically daily.       omeprazole (PRILOSEC) 40 MG DR capsule Take 40 mg by mouth daily       potassium 99 MG TABS Take 1 tablet by mouth daily. ? Take 6 a day       predniSONE (DELTASONE) 20 MG tablet Take 2 tablets (40 mg) by mouth daily. Take 40mg for 5 days for COPD exacerbation 120 tablet 0     rosuvastatin (CRESTOR) 40 MG tablet TAKE ONE TABLET BY MOUTH ONCE DAILY AT BEDTIME. FOLLOW UP APPOINTMENT NEEDED 90 tablet 2     SYNTHROID 100 MCG tablet        tiotropium (SPIRIVA RESPIMAT) 2.5 MCG/ACT inhaler Inhale 2 puffs into the lungs daily       triamcinolone (KENALOG) 0.1 % external cream APPLY A THIN LAYER TOPICALLY TO LEGS AS DIRECTED FOR 14 DAYS THEN AS NEEDED FOR FLARES         Physical Exam:  /74 (BP Location: Right arm, Patient Position: Sitting, Cuff Size: Adult Large)   Pulse 69   Wt 100.2 kg (220 lb 12.8 oz)   LMP 11/30/1997   SpO2 98%   BMI 33.08 kg/m    Gen: awake, alert, oriented, no distress  HEENT: nasal turbinates are unremarkable, no oropharyngeal lesions, no cervical or supraclavicular lymphadenopathy  CV: RRR, no M/G/R  Resp: diminished air entry. No wheezing or rhonchi.   Skin: no apparent rashes  Ext: no cyanosis, clubbing or edema  Neuro: alert, nonfocal    Labs:  Reviewed; unremarkable.     Imaging studies:  Personally reviewed the LDCT from Dec 2022  No suspicious findings. Pulmonary emphysema was seen.    Pulmonary Function Testing  1/19/2024  FEV1 0.93L, 37%  FVC 69%  +13% improvement in FVC with BD  Ratio 0.42  TLC 6.86L, 122%  %  Dlco 47% ewa for hgb  Flow volume curve shows obstruction.     Again, thank you for allowing me to participate in the care of your patient.        Sincerely,        Hemal Hernandez MD    Electronically signed

## 2025-08-17 ENCOUNTER — MYC MEDICAL ADVICE (OUTPATIENT)
Dept: PULMONOLOGY | Facility: CLINIC | Age: 65
End: 2025-08-17
Payer: MEDICARE

## (undated) DEVICE — PACK MINOR CUSTOM ASC

## (undated) DEVICE — LINEN TOWEL PACK X6 WHITE 5487

## (undated) DEVICE — DRAPE LAP PEDS DISP 29492

## (undated) DEVICE — ADH SKIN CLOSURE PREMIERPRO EXOFIN 1.0ML 3470

## (undated) DEVICE — SOL WATER IRRIG 500ML BOTTLE 2F7113

## (undated) DEVICE — SOL NACL 0.9% IRRIG 1000ML BOTTLE 2F7124

## (undated) DEVICE — SOL NACL 0.9% IRRIG 500ML BOTTLE 2F7123

## (undated) DEVICE — SUCTION MANIFOLD NEPTUNE 2 SYS 1 PORT 702-025-000

## (undated) DEVICE — DRAPE LAP TRANSVERSE 29421

## (undated) DEVICE — ESU ELEC BLADE 2.75" COATED/INSULATED E1455

## (undated) DEVICE — NDL BLUNT 18GA 1" W/O FILTER 305181

## (undated) DEVICE — SPECIMEN CONTAINER W/10% BUFFERED FORMALIN 120ML 591201

## (undated) DEVICE — SUCTION TIP YANKAUER STR K87

## (undated) DEVICE — PREP PAD ALCOHOL 6818

## (undated) DEVICE — PAD CHUX UNDERPAD 30X30"

## (undated) DEVICE — SU VICRYL 3-0 SH 27" J316H

## (undated) DEVICE — SU SILK 3-0 SH 30" K832H

## (undated) DEVICE — LINEN TOWEL PACK X5 5464

## (undated) DEVICE — SYR 10ML FINGER CONTROL W/O NDL 309695

## (undated) DEVICE — PREP CHLORAPREP 26ML TINTED ORANGE  260815

## (undated) DEVICE — SU PDS II 4-0 FS-2 27" Z422H

## (undated) DEVICE — DRAPE IOBAN INCISE 23X17" 6650EZ

## (undated) DEVICE — NDL 25GA 2"  8881200441

## (undated) DEVICE — CLIP HORIZON MED BLUE 002200

## (undated) DEVICE — CLIP HORIZON SM RED WIDE SLOT 001201

## (undated) DEVICE — MARKER MARGIN MARKER STD 6 COLOR SGL USE MMS6

## (undated) DEVICE — GLOVE PROTEXIS POWDER FREE SMT 8.0  2D72PT80X

## (undated) DEVICE — BNDG COBAN 2"X5YDS CO-FLEX UNSTERILE ASSRTD CLRS LF 5200CP

## (undated) DEVICE — ESU GROUND PAD ADULT W/CORD E7507

## (undated) RX ORDER — PROPOFOL 10 MG/ML
INJECTION, EMULSION INTRAVENOUS
Status: DISPENSED
Start: 2019-12-31

## (undated) RX ORDER — ACETAMINOPHEN 325 MG/1
TABLET ORAL
Status: DISPENSED
Start: 2019-12-31

## (undated) RX ORDER — ISOSULFAN BLUE 50 MG/5ML
INJECTION, SOLUTION SUBCUTANEOUS
Status: DISPENSED
Start: 2019-12-31

## (undated) RX ORDER — LIDOCAINE HYDROCHLORIDE AND EPINEPHRINE 10; 10 MG/ML; UG/ML
INJECTION, SOLUTION INFILTRATION; PERINEURAL
Status: DISPENSED
Start: 2019-12-31

## (undated) RX ORDER — GABAPENTIN 300 MG/1
CAPSULE ORAL
Status: DISPENSED
Start: 2019-12-31

## (undated) RX ORDER — CEFAZOLIN SODIUM 2 G/50ML
SOLUTION INTRAVENOUS
Status: DISPENSED
Start: 2019-12-31

## (undated) RX ORDER — BUPIVACAINE HYDROCHLORIDE 2.5 MG/ML
INJECTION, SOLUTION EPIDURAL; INFILTRATION; INTRACAUDAL
Status: DISPENSED
Start: 2019-12-31

## (undated) RX ORDER — FENTANYL CITRATE 50 UG/ML
INJECTION, SOLUTION INTRAMUSCULAR; INTRAVENOUS
Status: DISPENSED
Start: 2019-12-31